# Patient Record
Sex: MALE | Race: WHITE | NOT HISPANIC OR LATINO | Employment: OTHER | URBAN - METROPOLITAN AREA
[De-identification: names, ages, dates, MRNs, and addresses within clinical notes are randomized per-mention and may not be internally consistent; named-entity substitution may affect disease eponyms.]

---

## 2017-04-06 ENCOUNTER — COMMUNICATION - HEALTHEAST (OUTPATIENT)
Dept: INTERNAL MEDICINE | Facility: CLINIC | Age: 62
End: 2017-04-06

## 2017-10-06 ENCOUNTER — OFFICE VISIT - HEALTHEAST (OUTPATIENT)
Dept: INTERNAL MEDICINE | Facility: CLINIC | Age: 62
End: 2017-10-06

## 2017-10-06 DIAGNOSIS — Z00.00 HEALTHCARE MAINTENANCE: ICD-10-CM

## 2017-10-06 DIAGNOSIS — E78.00 PURE HYPERCHOLESTEROLEMIA: ICD-10-CM

## 2017-10-06 DIAGNOSIS — Z12.5 PROSTATE CANCER SCREENING: ICD-10-CM

## 2017-10-06 DIAGNOSIS — Z51.81 MEDICATION MONITORING ENCOUNTER: ICD-10-CM

## 2017-10-06 DIAGNOSIS — I10 ESSENTIAL HYPERTENSION: ICD-10-CM

## 2017-10-06 LAB
CHOLEST SERPL-MCNC: 169 MG/DL
FASTING STATUS PATIENT QL REPORTED: YES
HDLC SERPL-MCNC: 56 MG/DL
LDLC SERPL CALC-MCNC: 72 MG/DL
PSA SERPL-MCNC: 2 NG/ML (ref 0–4.5)
TRIGL SERPL-MCNC: 207 MG/DL

## 2017-10-06 ASSESSMENT — MIFFLIN-ST. JEOR: SCORE: 1623.61

## 2017-10-09 ENCOUNTER — COMMUNICATION - HEALTHEAST (OUTPATIENT)
Dept: INTERNAL MEDICINE | Facility: CLINIC | Age: 62
End: 2017-10-09

## 2017-11-03 ENCOUNTER — OFFICE VISIT - HEALTHEAST (OUTPATIENT)
Dept: INTERNAL MEDICINE | Facility: CLINIC | Age: 62
End: 2017-11-03

## 2017-11-03 DIAGNOSIS — I10 ESSENTIAL HYPERTENSION: ICD-10-CM

## 2017-11-03 ASSESSMENT — MIFFLIN-ST. JEOR: SCORE: 1637.22

## 2017-12-04 ENCOUNTER — COMMUNICATION - HEALTHEAST (OUTPATIENT)
Dept: INTERNAL MEDICINE | Facility: CLINIC | Age: 62
End: 2017-12-04

## 2017-12-04 DIAGNOSIS — E78.00 PURE HYPERCHOLESTEROLEMIA: ICD-10-CM

## 2018-01-14 ENCOUNTER — COMMUNICATION - HEALTHEAST (OUTPATIENT)
Dept: INTERNAL MEDICINE | Facility: CLINIC | Age: 63
End: 2018-01-14

## 2018-01-14 DIAGNOSIS — I10 HTN (HYPERTENSION): ICD-10-CM

## 2018-03-21 ENCOUNTER — RECORDS - HEALTHEAST (OUTPATIENT)
Dept: ADMINISTRATIVE | Facility: OTHER | Age: 63
End: 2018-03-21

## 2018-04-11 ENCOUNTER — RECORDS - HEALTHEAST (OUTPATIENT)
Dept: ADMINISTRATIVE | Facility: OTHER | Age: 63
End: 2018-04-11

## 2018-09-08 ENCOUNTER — COMMUNICATION - HEALTHEAST (OUTPATIENT)
Dept: INTERNAL MEDICINE | Facility: CLINIC | Age: 63
End: 2018-09-08

## 2018-09-08 DIAGNOSIS — E78.00 PURE HYPERCHOLESTEROLEMIA: ICD-10-CM

## 2018-10-29 ENCOUNTER — COMMUNICATION - HEALTHEAST (OUTPATIENT)
Dept: INTERNAL MEDICINE | Facility: CLINIC | Age: 63
End: 2018-10-29

## 2018-10-29 DIAGNOSIS — I10 HTN (HYPERTENSION): ICD-10-CM

## 2018-11-12 ENCOUNTER — OFFICE VISIT - HEALTHEAST (OUTPATIENT)
Dept: INTERNAL MEDICINE | Facility: CLINIC | Age: 63
End: 2018-11-12

## 2018-11-12 ENCOUNTER — COMMUNICATION - HEALTHEAST (OUTPATIENT)
Dept: INTERNAL MEDICINE | Facility: CLINIC | Age: 63
End: 2018-11-12

## 2018-11-12 DIAGNOSIS — E78.00 PURE HYPERCHOLESTEROLEMIA: ICD-10-CM

## 2018-11-12 DIAGNOSIS — I10 ESSENTIAL HYPERTENSION: ICD-10-CM

## 2018-11-12 DIAGNOSIS — Z00.00 HEALTHCARE MAINTENANCE: ICD-10-CM

## 2018-11-12 DIAGNOSIS — Z51.81 MEDICATION MONITORING ENCOUNTER: ICD-10-CM

## 2018-11-12 DIAGNOSIS — Z12.5 PROSTATE CANCER SCREENING: ICD-10-CM

## 2018-11-12 LAB
ALBUMIN SERPL-MCNC: 4 G/DL (ref 3.5–5)
ALP SERPL-CCNC: 87 U/L (ref 45–120)
ALT SERPL W P-5'-P-CCNC: 21 U/L (ref 0–45)
ANION GAP SERPL CALCULATED.3IONS-SCNC: 10 MMOL/L (ref 5–18)
AST SERPL W P-5'-P-CCNC: 26 U/L (ref 0–40)
BILIRUB SERPL-MCNC: 0.5 MG/DL (ref 0–1)
BUN SERPL-MCNC: 22 MG/DL (ref 8–22)
CALCIUM SERPL-MCNC: 10 MG/DL (ref 8.5–10.5)
CHLORIDE BLD-SCNC: 103 MMOL/L (ref 98–107)
CO2 SERPL-SCNC: 27 MMOL/L (ref 22–31)
CREAT SERPL-MCNC: 0.83 MG/DL (ref 0.7–1.3)
ERYTHROCYTE [DISTWIDTH] IN BLOOD BY AUTOMATED COUNT: 14.4 % (ref 11–14.5)
GFR SERPL CREATININE-BSD FRML MDRD: >60 ML/MIN/1.73M2
GLUCOSE BLD-MCNC: 88 MG/DL (ref 70–125)
HCT VFR BLD AUTO: 43.8 % (ref 40–54)
HGB BLD-MCNC: 14.9 G/DL (ref 14–18)
LDLC SERPL CALC-MCNC: 60 MG/DL
MCH RBC QN AUTO: 29.7 PG (ref 27–34)
MCHC RBC AUTO-ENTMCNC: 34 G/DL (ref 32–36)
MCV RBC AUTO: 87 FL (ref 80–100)
PLATELET # BLD AUTO: 194 THOU/UL (ref 140–440)
PMV BLD AUTO: 10.4 FL (ref 8.5–12.5)
POTASSIUM BLD-SCNC: 4.2 MMOL/L (ref 3.5–5)
PROT SERPL-MCNC: 7.1 G/DL (ref 6–8)
PSA SERPL-MCNC: 2.3 NG/ML (ref 0–4.5)
RBC # BLD AUTO: 5.02 MILL/UL (ref 4.4–6.2)
SODIUM SERPL-SCNC: 140 MMOL/L (ref 136–145)
WBC: 7.7 THOU/UL (ref 4–11)

## 2018-11-12 ASSESSMENT — MIFFLIN-ST. JEOR: SCORE: 1612.49

## 2018-12-19 ENCOUNTER — COMMUNICATION - HEALTHEAST (OUTPATIENT)
Dept: INTERNAL MEDICINE | Facility: CLINIC | Age: 63
End: 2018-12-19

## 2018-12-19 DIAGNOSIS — E78.00 PURE HYPERCHOLESTEROLEMIA: ICD-10-CM

## 2019-04-29 ENCOUNTER — COMMUNICATION - HEALTHEAST (OUTPATIENT)
Dept: INTERNAL MEDICINE | Facility: CLINIC | Age: 64
End: 2019-04-29

## 2019-04-29 DIAGNOSIS — I10 HTN (HYPERTENSION): ICD-10-CM

## 2019-07-22 ENCOUNTER — OFFICE VISIT - HEALTHEAST (OUTPATIENT)
Dept: INTERNAL MEDICINE | Facility: CLINIC | Age: 64
End: 2019-07-22

## 2019-07-22 DIAGNOSIS — E78.00 PURE HYPERCHOLESTEROLEMIA: ICD-10-CM

## 2019-07-22 DIAGNOSIS — I10 ESSENTIAL HYPERTENSION: ICD-10-CM

## 2019-07-22 DIAGNOSIS — M25.512 ACUTE PAIN OF LEFT SHOULDER: ICD-10-CM

## 2019-07-22 DIAGNOSIS — J30.9 ALLERGIC RHINITIS, UNSPECIFIED SEASONALITY, UNSPECIFIED TRIGGER: ICD-10-CM

## 2019-08-01 ENCOUNTER — COMMUNICATION - HEALTHEAST (OUTPATIENT)
Dept: INTERNAL MEDICINE | Facility: CLINIC | Age: 64
End: 2019-08-01

## 2019-08-01 DIAGNOSIS — I10 HTN (HYPERTENSION): ICD-10-CM

## 2019-08-20 ENCOUNTER — RECORDS - HEALTHEAST (OUTPATIENT)
Dept: ADMINISTRATIVE | Facility: OTHER | Age: 64
End: 2019-08-20

## 2019-09-02 ENCOUNTER — COMMUNICATION - HEALTHEAST (OUTPATIENT)
Dept: INTERNAL MEDICINE | Facility: CLINIC | Age: 64
End: 2019-09-02

## 2019-09-02 DIAGNOSIS — J32.9 CHRONIC SINUSITIS, UNSPECIFIED LOCATION: ICD-10-CM

## 2019-09-12 ENCOUNTER — RECORDS - HEALTHEAST (OUTPATIENT)
Dept: ADMINISTRATIVE | Facility: OTHER | Age: 64
End: 2019-09-12

## 2019-10-27 ENCOUNTER — COMMUNICATION - HEALTHEAST (OUTPATIENT)
Dept: INTERNAL MEDICINE | Facility: CLINIC | Age: 64
End: 2019-10-27

## 2019-10-27 DIAGNOSIS — I10 HTN (HYPERTENSION): ICD-10-CM

## 2019-12-12 ENCOUNTER — RECORDS - HEALTHEAST (OUTPATIENT)
Dept: ADMINISTRATIVE | Facility: OTHER | Age: 64
End: 2019-12-12

## 2019-12-15 ENCOUNTER — COMMUNICATION - HEALTHEAST (OUTPATIENT)
Dept: INTERNAL MEDICINE | Facility: CLINIC | Age: 64
End: 2019-12-15

## 2019-12-15 DIAGNOSIS — E78.00 PURE HYPERCHOLESTEROLEMIA: ICD-10-CM

## 2020-01-02 ENCOUNTER — OFFICE VISIT - HEALTHEAST (OUTPATIENT)
Dept: INTERNAL MEDICINE | Facility: CLINIC | Age: 65
End: 2020-01-02

## 2020-01-02 DIAGNOSIS — E78.00 PURE HYPERCHOLESTEROLEMIA: ICD-10-CM

## 2020-01-02 DIAGNOSIS — Z00.00 HEALTHCARE MAINTENANCE: ICD-10-CM

## 2020-01-02 DIAGNOSIS — Z51.81 MEDICATION MONITORING ENCOUNTER: ICD-10-CM

## 2020-01-02 DIAGNOSIS — I10 ESSENTIAL HYPERTENSION: ICD-10-CM

## 2020-01-02 DIAGNOSIS — Z12.5 PROSTATE CANCER SCREENING: ICD-10-CM

## 2020-01-02 LAB
ALBUMIN SERPL-MCNC: 4.3 G/DL (ref 3.5–5)
ALP SERPL-CCNC: 84 U/L (ref 45–120)
ALT SERPL W P-5'-P-CCNC: 22 U/L (ref 0–45)
ANION GAP SERPL CALCULATED.3IONS-SCNC: 12 MMOL/L (ref 5–18)
AST SERPL W P-5'-P-CCNC: 30 U/L (ref 0–40)
BILIRUB SERPL-MCNC: 0.7 MG/DL (ref 0–1)
BUN SERPL-MCNC: 16 MG/DL (ref 8–22)
CALCIUM SERPL-MCNC: 10 MG/DL (ref 8.5–10.5)
CHLORIDE BLD-SCNC: 104 MMOL/L (ref 98–107)
CHOLEST SERPL-MCNC: 154 MG/DL
CO2 SERPL-SCNC: 23 MMOL/L (ref 22–31)
CREAT SERPL-MCNC: 0.91 MG/DL (ref 0.7–1.3)
ERYTHROCYTE [DISTWIDTH] IN BLOOD BY AUTOMATED COUNT: 14.2 % (ref 11–14.5)
FASTING STATUS PATIENT QL REPORTED: YES
GFR SERPL CREATININE-BSD FRML MDRD: >60 ML/MIN/1.73M2
GLUCOSE BLD-MCNC: 85 MG/DL (ref 70–125)
HCT VFR BLD AUTO: 40.8 % (ref 40–54)
HDLC SERPL-MCNC: 53 MG/DL
HGB BLD-MCNC: 13 G/DL (ref 14–18)
LDLC SERPL CALC-MCNC: 67 MG/DL
MCH RBC QN AUTO: 26.6 PG (ref 27–34)
MCHC RBC AUTO-ENTMCNC: 31.9 G/DL (ref 32–36)
MCV RBC AUTO: 84 FL (ref 80–100)
PLATELET # BLD AUTO: 261 THOU/UL (ref 140–440)
PMV BLD AUTO: 7 FL (ref 7–10)
POTASSIUM BLD-SCNC: 4 MMOL/L (ref 3.5–5)
PROT SERPL-MCNC: 7.1 G/DL (ref 6–8)
PSA SERPL-MCNC: 2.5 NG/ML (ref 0–4.5)
RBC # BLD AUTO: 4.88 MILL/UL (ref 4.4–6.2)
SODIUM SERPL-SCNC: 139 MMOL/L (ref 136–145)
TRIGL SERPL-MCNC: 168 MG/DL
WBC: 7.9 THOU/UL (ref 4–11)

## 2020-01-02 RX ORDER — ASCORBIC ACID, THIAMINE, RIBOFLAVIN, NIACINAMIDE, PYRIDOXINE, FOLIC ACID, COBALAMIN, BIOTIN, PANTOTHENIC ACID, ZINC 100; 1.5; 1.7; 20; 10; 1; 6; 300; 10; 5 MG/1; MG/1; MG/1; MG/1; MG/1; MG/1; UG/1; UG/1; MG/1; MG/1
1 TABLET, COATED ORAL EVERY OTHER DAY
Status: SHIPPED | COMMUNITY
Start: 2020-01-02

## 2020-01-02 RX ORDER — OMEPRAZOLE 40 MG/1
CAPSULE, DELAYED RELEASE ORAL
Refills: 2 | Status: SHIPPED | COMMUNITY
Start: 2019-12-13 | End: 2021-08-04

## 2020-01-02 ASSESSMENT — MIFFLIN-ST. JEOR: SCORE: 1606.6

## 2020-01-03 ENCOUNTER — COMMUNICATION - HEALTHEAST (OUTPATIENT)
Dept: INTERNAL MEDICINE | Facility: CLINIC | Age: 65
End: 2020-01-03

## 2020-01-03 DIAGNOSIS — D64.9 ANEMIA, UNSPECIFIED TYPE: ICD-10-CM

## 2020-01-04 ENCOUNTER — COMMUNICATION - HEALTHEAST (OUTPATIENT)
Dept: INTERNAL MEDICINE | Facility: CLINIC | Age: 65
End: 2020-01-04

## 2020-01-07 ENCOUNTER — AMBULATORY - HEALTHEAST (OUTPATIENT)
Dept: LAB | Facility: CLINIC | Age: 65
End: 2020-01-07

## 2020-01-07 ENCOUNTER — COMMUNICATION - HEALTHEAST (OUTPATIENT)
Dept: INTERNAL MEDICINE | Facility: CLINIC | Age: 65
End: 2020-01-07

## 2020-01-07 DIAGNOSIS — D64.9 ANEMIA, UNSPECIFIED TYPE: ICD-10-CM

## 2020-01-07 DIAGNOSIS — D50.9 IRON DEFICIENCY ANEMIA, UNSPECIFIED IRON DEFICIENCY ANEMIA TYPE: ICD-10-CM

## 2020-01-07 LAB
BASOPHILS # BLD AUTO: 0 THOU/UL (ref 0–0.2)
BASOPHILS NFR BLD AUTO: 1 % (ref 0–2)
EOSINOPHIL # BLD AUTO: 0.3 THOU/UL (ref 0–0.4)
EOSINOPHIL NFR BLD AUTO: 5 % (ref 0–6)
ERYTHROCYTE [DISTWIDTH] IN BLOOD BY AUTOMATED COUNT: 14.7 % (ref 11–14.5)
FERRITIN SERPL-MCNC: 23 NG/ML (ref 27–300)
HCT VFR BLD AUTO: 40.5 % (ref 40–54)
HGB BLD-MCNC: 13.1 G/DL (ref 14–18)
IRON SATN MFR SERPL: 8 % (ref 20–50)
IRON SERPL-MCNC: 32 UG/DL (ref 42–175)
LYMPHOCYTES # BLD AUTO: 1 THOU/UL (ref 0.8–4.4)
LYMPHOCYTES NFR BLD AUTO: 16 % (ref 20–40)
MCH RBC QN AUTO: 26.9 PG (ref 27–34)
MCHC RBC AUTO-ENTMCNC: 32.4 G/DL (ref 32–36)
MCV RBC AUTO: 83 FL (ref 80–100)
MONOCYTES # BLD AUTO: 0.7 THOU/UL (ref 0–0.9)
MONOCYTES NFR BLD AUTO: 11 % (ref 2–10)
NEUTROPHILS # BLD AUTO: 4.2 THOU/UL (ref 2–7.7)
NEUTROPHILS NFR BLD AUTO: 67 % (ref 50–70)
PLATELET # BLD AUTO: 231 THOU/UL (ref 140–440)
PMV BLD AUTO: 7.7 FL (ref 7–10)
RBC # BLD AUTO: 4.88 MILL/UL (ref 4.4–6.2)
TIBC SERPL-MCNC: 413 UG/DL (ref 313–563)
TRANSFERRIN SERPL-MCNC: 331 MG/DL (ref 212–360)
VIT B12 SERPL-MCNC: 613 PG/ML (ref 213–816)
WBC: 6.3 THOU/UL (ref 4–11)

## 2020-01-21 ENCOUNTER — RECORDS - HEALTHEAST (OUTPATIENT)
Dept: ADMINISTRATIVE | Facility: OTHER | Age: 65
End: 2020-01-21

## 2020-01-22 ENCOUNTER — RECORDS - HEALTHEAST (OUTPATIENT)
Dept: ADMINISTRATIVE | Facility: OTHER | Age: 65
End: 2020-01-22

## 2020-01-22 ENCOUNTER — COMMUNICATION - HEALTHEAST (OUTPATIENT)
Dept: INTERNAL MEDICINE | Facility: CLINIC | Age: 65
End: 2020-01-22

## 2020-01-22 DIAGNOSIS — D50.9 IRON DEFICIENCY ANEMIA, UNSPECIFIED IRON DEFICIENCY ANEMIA TYPE: ICD-10-CM

## 2020-02-06 ENCOUNTER — RECORDS - HEALTHEAST (OUTPATIENT)
Dept: ADMINISTRATIVE | Facility: OTHER | Age: 65
End: 2020-02-06

## 2020-02-11 ENCOUNTER — OFFICE VISIT - HEALTHEAST (OUTPATIENT)
Dept: INTERNAL MEDICINE | Facility: CLINIC | Age: 65
End: 2020-02-11

## 2020-02-11 DIAGNOSIS — D50.9 IRON DEFICIENCY ANEMIA, UNSPECIFIED IRON DEFICIENCY ANEMIA TYPE: ICD-10-CM

## 2020-02-11 DIAGNOSIS — I10 ESSENTIAL HYPERTENSION: ICD-10-CM

## 2020-02-11 DIAGNOSIS — K21.9 REFLUX LARYNGITIS: ICD-10-CM

## 2020-02-11 DIAGNOSIS — J04.0 REFLUX LARYNGITIS: ICD-10-CM

## 2020-02-11 RX ORDER — FERROUS SULFATE 325(65) MG
1 TABLET ORAL
Qty: 60 TABLET | Refills: 0 | Status: SHIPPED | OUTPATIENT
Start: 2020-02-11 | End: 2021-08-04

## 2020-03-12 ENCOUNTER — RECORDS - HEALTHEAST (OUTPATIENT)
Dept: ADMINISTRATIVE | Facility: OTHER | Age: 65
End: 2020-03-12

## 2020-05-31 ENCOUNTER — COMMUNICATION - HEALTHEAST (OUTPATIENT)
Dept: INTERNAL MEDICINE | Facility: CLINIC | Age: 65
End: 2020-05-31

## 2020-09-20 ENCOUNTER — COMMUNICATION - HEALTHEAST (OUTPATIENT)
Dept: INTERNAL MEDICINE | Facility: CLINIC | Age: 65
End: 2020-09-20

## 2020-09-20 DIAGNOSIS — E78.00 PURE HYPERCHOLESTEROLEMIA: ICD-10-CM

## 2020-09-23 ENCOUNTER — RECORDS - HEALTHEAST (OUTPATIENT)
Dept: ADMINISTRATIVE | Facility: OTHER | Age: 65
End: 2020-09-23

## 2020-10-01 ENCOUNTER — AMBULATORY - HEALTHEAST (OUTPATIENT)
Dept: LAB | Facility: CLINIC | Age: 65
End: 2020-10-01

## 2020-10-01 DIAGNOSIS — D50.9 IRON DEFICIENCY ANEMIA, UNSPECIFIED IRON DEFICIENCY ANEMIA TYPE: ICD-10-CM

## 2020-10-01 LAB
BASOPHILS # BLD AUTO: 0 THOU/UL (ref 0–0.2)
BASOPHILS NFR BLD AUTO: 0 % (ref 0–2)
EOSINOPHIL # BLD AUTO: 0.2 THOU/UL (ref 0–0.4)
EOSINOPHIL NFR BLD AUTO: 2 % (ref 0–6)
ERYTHROCYTE [DISTWIDTH] IN BLOOD BY AUTOMATED COUNT: 11.7 % (ref 11–14.5)
FERRITIN SERPL-MCNC: 38 NG/ML (ref 27–300)
HCT VFR BLD AUTO: 47.3 % (ref 40–54)
HGB BLD-MCNC: 15.5 G/DL (ref 14–18)
IRON SATN MFR SERPL: 28 % (ref 20–50)
IRON SERPL-MCNC: 100 UG/DL (ref 42–175)
LYMPHOCYTES # BLD AUTO: 1.4 THOU/UL (ref 0.8–4.4)
LYMPHOCYTES NFR BLD AUTO: 19 % (ref 20–40)
MCH RBC QN AUTO: 30.6 PG (ref 27–34)
MCHC RBC AUTO-ENTMCNC: 32.8 G/DL (ref 32–36)
MCV RBC AUTO: 93 FL (ref 80–100)
MONOCYTES # BLD AUTO: 0.4 THOU/UL (ref 0–0.9)
MONOCYTES NFR BLD AUTO: 6 % (ref 2–10)
NEUTROPHILS # BLD AUTO: 5.3 THOU/UL (ref 2–7.7)
NEUTROPHILS NFR BLD AUTO: 72 % (ref 50–70)
PLATELET # BLD AUTO: 186 THOU/UL (ref 140–440)
PMV BLD AUTO: 7.6 FL (ref 7–10)
RBC # BLD AUTO: 5.08 MILL/UL (ref 4.4–6.2)
TIBC SERPL-MCNC: 358 UG/DL (ref 313–563)
TRANSFERRIN SERPL-MCNC: 287 MG/DL (ref 212–360)
WBC: 7.3 THOU/UL (ref 4–11)

## 2020-10-02 ENCOUNTER — COMMUNICATION - HEALTHEAST (OUTPATIENT)
Dept: INTERNAL MEDICINE | Facility: CLINIC | Age: 65
End: 2020-10-02

## 2021-01-17 ENCOUNTER — COMMUNICATION - HEALTHEAST (OUTPATIENT)
Dept: INTERNAL MEDICINE | Facility: CLINIC | Age: 66
End: 2021-01-17

## 2021-01-17 DIAGNOSIS — I10 ESSENTIAL HYPERTENSION: ICD-10-CM

## 2021-01-21 ENCOUNTER — OFFICE VISIT - HEALTHEAST (OUTPATIENT)
Dept: INTERNAL MEDICINE | Facility: CLINIC | Age: 66
End: 2021-01-21

## 2021-01-21 DIAGNOSIS — E78.00 HYPERCHOLESTEROLEMIA: ICD-10-CM

## 2021-01-21 DIAGNOSIS — Z12.5 SCREENING FOR PROSTATE CANCER: ICD-10-CM

## 2021-01-21 DIAGNOSIS — Z23 NEED FOR VACCINATION WITH 13-POLYVALENT PNEUMOCOCCAL CONJUGATE VACCINE: ICD-10-CM

## 2021-01-21 DIAGNOSIS — J04.0 REFLUX LARYNGITIS: ICD-10-CM

## 2021-01-21 DIAGNOSIS — Z86.0100 HISTORY OF COLONIC POLYPS: ICD-10-CM

## 2021-01-21 DIAGNOSIS — Z51.81 ENCOUNTER FOR THERAPEUTIC DRUG MONITORING: ICD-10-CM

## 2021-01-21 DIAGNOSIS — I10 ESSENTIAL HYPERTENSION: ICD-10-CM

## 2021-01-21 DIAGNOSIS — Z00.00 ROUTINE GENERAL MEDICAL EXAMINATION AT A HEALTH CARE FACILITY: ICD-10-CM

## 2021-01-21 DIAGNOSIS — Z00.00 WELCOME TO MEDICARE PREVENTIVE VISIT: ICD-10-CM

## 2021-01-21 DIAGNOSIS — D50.9 IRON DEFICIENCY ANEMIA, UNSPECIFIED IRON DEFICIENCY ANEMIA TYPE: ICD-10-CM

## 2021-01-21 DIAGNOSIS — K21.9 REFLUX LARYNGITIS: ICD-10-CM

## 2021-01-21 LAB
ALBUMIN SERPL-MCNC: 4.6 G/DL (ref 3.5–5)
ALP SERPL-CCNC: 84 U/L (ref 45–120)
ALT SERPL W P-5'-P-CCNC: 19 U/L (ref 0–45)
ANION GAP SERPL CALCULATED.3IONS-SCNC: 10 MMOL/L (ref 5–18)
AST SERPL W P-5'-P-CCNC: 25 U/L (ref 0–40)
ATRIAL RATE - MUSE: 61 BPM
BILIRUB SERPL-MCNC: 0.8 MG/DL (ref 0–1)
BUN SERPL-MCNC: 16 MG/DL (ref 8–22)
CALCIUM SERPL-MCNC: 10.1 MG/DL (ref 8.5–10.5)
CHLORIDE BLD-SCNC: 104 MMOL/L (ref 98–107)
CO2 SERPL-SCNC: 27 MMOL/L (ref 22–31)
CREAT SERPL-MCNC: 0.84 MG/DL (ref 0.7–1.3)
DIASTOLIC BLOOD PRESSURE - MUSE: NORMAL
ERYTHROCYTE [DISTWIDTH] IN BLOOD BY AUTOMATED COUNT: 12.8 % (ref 11–14.5)
FERRITIN SERPL-MCNC: 51 NG/ML (ref 27–300)
GFR SERPL CREATININE-BSD FRML MDRD: >60 ML/MIN/1.73M2
GLUCOSE BLD-MCNC: 87 MG/DL (ref 70–125)
HCT VFR BLD AUTO: 49.8 % (ref 40–54)
HGB BLD-MCNC: 16.6 G/DL (ref 14–18)
INTERPRETATION ECG - MUSE: NORMAL
IRON SATN MFR SERPL: 30 % (ref 20–50)
IRON SERPL-MCNC: 113 UG/DL (ref 42–175)
LDLC SERPL CALC-MCNC: 40 MG/DL
MCH RBC QN AUTO: 31 PG (ref 27–34)
MCHC RBC AUTO-ENTMCNC: 33.4 G/DL (ref 32–36)
MCV RBC AUTO: 93 FL (ref 80–100)
P AXIS - MUSE: 24 DEGREES
PLATELET # BLD AUTO: 186 THOU/UL (ref 140–440)
PMV BLD AUTO: 7.4 FL (ref 7–10)
POTASSIUM BLD-SCNC: 4.2 MMOL/L (ref 3.5–5)
PR INTERVAL - MUSE: 186 MS
PROT SERPL-MCNC: 7.3 G/DL (ref 6–8)
PSA SERPL-MCNC: 3.3 NG/ML (ref 0–4.5)
QRS DURATION - MUSE: 112 MS
QT - MUSE: 418 MS
QTC - MUSE: 420 MS
R AXIS - MUSE: 36 DEGREES
RBC # BLD AUTO: 5.37 MILL/UL (ref 4.4–6.2)
SODIUM SERPL-SCNC: 141 MMOL/L (ref 136–145)
SYSTOLIC BLOOD PRESSURE - MUSE: NORMAL
T AXIS - MUSE: 6 DEGREES
TIBC SERPL-MCNC: 371 UG/DL (ref 313–563)
TRANSFERRIN SERPL-MCNC: 297 MG/DL (ref 212–360)
VENTRICULAR RATE- MUSE: 61 BPM
WBC: 7 THOU/UL (ref 4–11)

## 2021-01-21 ASSESSMENT — MIFFLIN-ST. JEOR: SCORE: 1526.2

## 2021-01-22 ENCOUNTER — COMMUNICATION - HEALTHEAST (OUTPATIENT)
Dept: INTERNAL MEDICINE | Facility: CLINIC | Age: 66
End: 2021-01-22

## 2021-01-26 ENCOUNTER — COMMUNICATION - HEALTHEAST (OUTPATIENT)
Dept: INTERNAL MEDICINE | Facility: CLINIC | Age: 66
End: 2021-01-26

## 2021-01-26 DIAGNOSIS — E78.00 HYPERCHOLESTEROLEMIA: ICD-10-CM

## 2021-03-03 ENCOUNTER — HOSPITAL ENCOUNTER (OUTPATIENT)
Dept: CT IMAGING | Facility: CLINIC | Age: 66
Discharge: HOME | End: 2021-03-03
Attending: INTERNAL MEDICINE

## 2021-03-03 ENCOUNTER — COMMUNICATION - HEALTHEAST (OUTPATIENT)
Dept: INTERNAL MEDICINE | Facility: CLINIC | Age: 66
End: 2021-03-03

## 2021-03-03 DIAGNOSIS — E78.00 HYPERCHOLESTEROLEMIA: ICD-10-CM

## 2021-03-03 LAB
CV CALCIUM SCORE AGATSTON LM: 0
CV CALCIUM SCORING AGATSON LAD: 52
CV CALCIUM SCORING AGATSTON CX: 0
CV CALCIUM SCORING AGATSTON RCA: 0
CV CALCIUM SCORING AGATSTON TOTAL: 52

## 2021-04-14 ENCOUNTER — COMMUNICATION - HEALTHEAST (OUTPATIENT)
Dept: INTERNAL MEDICINE | Facility: CLINIC | Age: 66
End: 2021-04-14

## 2021-04-14 DIAGNOSIS — E78.00 PURE HYPERCHOLESTEROLEMIA: ICD-10-CM

## 2021-04-14 RX ORDER — SIMVASTATIN 20 MG
20 TABLET ORAL AT BEDTIME
Qty: 90 TABLET | Refills: 3 | Status: SHIPPED | OUTPATIENT
Start: 2021-04-14 | End: 2022-02-03

## 2021-04-20 ENCOUNTER — COMMUNICATION - HEALTHEAST (OUTPATIENT)
Dept: INTERNAL MEDICINE | Facility: CLINIC | Age: 66
End: 2021-04-20

## 2021-04-20 DIAGNOSIS — I10 ESSENTIAL HYPERTENSION: ICD-10-CM

## 2021-04-21 RX ORDER — LISINOPRIL 2.5 MG/1
2.5 TABLET ORAL DAILY
Qty: 90 TABLET | Refills: 2 | Status: SHIPPED | OUTPATIENT
Start: 2021-04-21 | End: 2022-01-11

## 2021-04-23 ENCOUNTER — COMMUNICATION - HEALTHEAST (OUTPATIENT)
Dept: ADMINISTRATIVE | Facility: CLINIC | Age: 66
End: 2021-04-23

## 2021-05-27 ENCOUNTER — RECORDS - HEALTHEAST (OUTPATIENT)
Dept: ADMINISTRATIVE | Facility: OTHER | Age: 66
End: 2021-05-27

## 2021-05-28 NOTE — TELEPHONE ENCOUNTER
Refill Approved    Rx renewed per Medication Renewal Policy. Medication was last renewed on 10/30/18.    Elena Leigh, [facility] Triage/Med Refill 4/29/2019     Requested Prescriptions   Pending Prescriptions Disp Refills     lisinopril (PRINIVIL,ZESTRIL) 2.5 MG tablet [Pharmacy Med Name: Lisinopril Oral Tablet 2.5 MG] 90 tablet 0     Sig: TAKE ONE TABLET BY MOUTH ONE TIME DAILY       Ace Inhibitors Refill Protocol Passed - 4/29/2019  7:00 AM        Passed - PCP or prescribing provider visit in past 12 months       Last office visit with prescriber/PCP: 11/3/2017 Sheldon Harvey MD OR same dept: Visit date not found OR same specialty: 11/3/2017 Sheldon Harvey MD  Last physical: 11/12/2018 Last MTM visit: Visit date not found   Next visit within 3 mo: Visit date not found  Next physical within 3 mo: Visit date not found  Prescriber OR PCP: Sheldon Harvey MD  Last diagnosis associated with med order: 1. HTN (hypertension)  - lisinopril (PRINIVIL,ZESTRIL) 2.5 MG tablet [Pharmacy Med Name: Lisinopril Oral Tablet 2.5 MG]; TAKE ONE TABLET BY MOUTH ONE TIME DAILY   Dispense: 90 tablet; Refill: 0    If protocol passes may refill for 12 months if within 3 months of last provider visit (or a total of 15 months).             Passed - Serum Potassium in past 12 months     Lab Results   Component Value Date    Potassium 4.2 11/12/2018             Passed - Blood pressure filed in past 12 months     BP Readings from Last 1 Encounters:   11/12/18 120/84             Passed - Serum Creatinine in past 12 months     Creatinine   Date Value Ref Range Status   11/12/2018 0.83 0.70 - 1.30 mg/dL Final

## 2021-05-29 ENCOUNTER — HEALTH MAINTENANCE LETTER (OUTPATIENT)
Age: 66
End: 2021-05-29

## 2021-05-30 NOTE — PROGRESS NOTES
"[facility] Follow Up Note    Roberto Belle   63 y.o. male    Date of Visit: 7/22/2019    Chief Complaint   Patient presents with     Postnasal drainage     Complaints of postnasal drainage getting worse in the last few weeks with constantly having to clear throat. Sneezed yesterday producing a small \"red bb gun sized ball\"     Subjective  For 6 weeks of increasing nasal congestion and postnasal drip, started on Allegra.  Proximal he 2 weeks ago he had a bonfire.  After that he had increasing nasal congestion and postnasal drip and started Nasacort which has started to help some.    He sneezed today and had a small nasal norris come out, made of mucus.  He did bring in that in for me to examine.    No epistaxis.  No fever or ear pain or sinus pain.  No shortness of breath or wheezing.    Hypertension controlled on lisinopril.    No significant cough.    He is also having some left shoulder tendinitis pain but not severe, that over the past couple of weeks.    He is on simvastatin 20 mg.  Family history of coronary disease with father.    He is on a low-dose aspirin.    Hypertension controlled with lisinopril 2.5 mg a day.    PMHx:    Past Medical History:   Diagnosis Date     Hypertension      PSHx:    Past Surgical History:   Procedure Laterality Date     UNDESCENDED TESTICLE EXPLORATION Left 1968     Immunizations:   Immunization History   Administered Date(s) Administered     Influenza, inj, historic,unspecified 10/19/2015, 09/20/2016, 09/18/2017     Influenza, seasonal,quad inj 6-35 mos 10/23/2014     Td,adult,historic,unspecified 12/15/2000     Tdap 11/18/2011       ROS A comprehensive review of systems was performed and was otherwise negative    Medications, allergies, and problem list were reviewed and updated    Exam  /82 (Patient Site: Right Arm, Patient Position: Sitting, Cuff Size: Adult Large)   Pulse 68   Wt 198 lb (89.8 kg)   BMI 31.48 kg/m    Nasal mucosa appears just mildly " edematous but clear discharge.  No purulent discharge or mass.  He does have moderate postnasal drip pharyngitis bilaterally.  No exudate.  No cervical or supraclavicular adenopathy or neck mass.  Lungs clear to auscultation.  Heart is regular without murmur.  Still good range of motion on the shoulder.    Assessment/Plan  1. Allergic rhinitis, unspecified seasonality, unspecified trigger  Increased symptoms with postnasal drip.  Mucus Nati expressed.  Symptoms better on Allegra and Nasacort.  I discussed saline gargling and saline irrigation with Loretta pot.    If symptoms do not improve over the next couple of weeks, or if they worsen, he can request an ENT referral.    No fever or acute sinusitis symptoms to warrant antibiotic at this time.    2. Acute pain of left shoulder  Likely tendinitis.  Patient was told he could request a referral to the orthopedic clinic if that worsens.    Avoiding NSAIDs with his hypertension on low-dose lisinopril.    3. Essential hypertension  Controlled on lisinopril.    Family history of coronary disease, and hypercholesterolemia on simvastatin.  I did discuss option for cardiac CT scan which he had asked about.  Cholesterol is well controlled on current simvastatin.  If he did want to consider going off simvastatin, he could consider a cardiac CT scan for risk stratification.  Otherwise with his hypertension and moderate cardiovascular risk, I would recommend he continue on a statin.    Follow this fall for a physical exam.    Return in about 4 months (around 11/22/2019) for Annual physical.   Patient Instructions   Continue current Allegra and Nasacort.  Gargle with salt water and you can use saline nasal irrigation such as Loretta pot.    Postnasal drip irritation should improve over the next few weeks.  If it continues to worsen, or not improve, you can request a referral to ENT for consult.    Follow-up in the fall for your physical exam.    If your shoulder pain worsens, you  can request a referral to the orthopedic clinic for further evaluation.  You could consider physical therapy for the shoulder.    Sheldon Harvey MD        Current Outpatient Medications   Medication Sig Dispense Refill     aspirin 81 mg chewable tablet Chew 81 mg daily.       co-enzyme Q-10 30 mg capsule Take 30 mg by mouth 2 (two) times a day.        lisinopril (PRINIVIL,ZESTRIL) 2.5 MG tablet Take 1 tablet (2.5 mg total) by mouth daily. 90 tablet 0     MULTIVITAMIN ORAL Take 1 tablet by mouth daily.              OMEGA-3/DHA/EPA/FISH OIL (FISH OIL-OMEGA-3 FATTY ACIDS) 300-1,000 mg capsule Take 2 g by mouth daily.       phytosterol combination no.1 500 mg cap Take by mouth. Take 2 tablets by mouth       simvastatin (ZOCOR) 20 MG tablet Take 1 tablet (20 mg total) by mouth at bedtime. 90 tablet 3     thiamine (VITAMIN B-1) 50 MG tablet Take 50 mg by mouth every other day.              triamcinolone (NASACORT) 55 mcg nasal inhaler 2 sprays into each nostril daily as needed.        No current facility-administered medications for this visit.      No Known Allergies  Social History     Tobacco Use     Smoking status: Never Smoker     Smokeless tobacco: Never Used   Substance Use Topics     Alcohol use: Yes     Drug use: Not on file

## 2021-05-30 NOTE — PATIENT INSTRUCTIONS - HE
Continue current Allegra and Nasacort.  Gargle with salt water and you can use saline nasal irrigation such as Loretta pot.    Postnasal drip irritation should improve over the next few weeks.  If it continues to worsen, or not improve, you can request a referral to ENT for consult.    Follow-up in the fall for your physical exam.    If your shoulder pain worsens, you can request a referral to the orthopedic clinic for further evaluation.  You could consider physical therapy for the shoulder.

## 2021-05-31 VITALS — BODY MASS INDEX: 30.45 KG/M2 | WEIGHT: 194 LBS | HEIGHT: 67 IN

## 2021-05-31 VITALS — BODY MASS INDEX: 30.92 KG/M2 | WEIGHT: 197 LBS | HEIGHT: 67 IN

## 2021-05-31 NOTE — TELEPHONE ENCOUNTER
Refill Approved    Rx renewed per Medication Renewal Policy. Medication was last renewed on 4/29/19.    Julia Phoenix, [facility] Triage/Med Refill 8/1/2019     Requested Prescriptions   Pending Prescriptions Disp Refills     lisinopril (PRINIVIL,ZESTRIL) 2.5 MG tablet [Pharmacy Med Name: Lisinopril Oral Tablet 2.5 MG] 90 tablet 0     Sig: Take 1 tablet (2.5 mg total) by mouth daily.       Ace Inhibitors Refill Protocol Passed - 8/1/2019 10:33 AM        Passed - PCP or prescribing provider visit in past 12 months       Last office visit with prescriber/PCP: 7/22/2019 Sheldon Harvey MD OR same dept: Visit date not found OR same specialty: 7/22/2019 Sheldon Harvey MD  Last physical: 11/12/2018 Last MTM visit: Visit date not found   Next visit within 3 mo: Visit date not found  Next physical within 3 mo: Visit date not found  Prescriber OR PCP: Sheldon Harvey MD  Last diagnosis associated with med order: 1. HTN (hypertension)  - lisinopril (PRINIVIL,ZESTRIL) 2.5 MG tablet [Pharmacy Med Name: Lisinopril Oral Tablet 2.5 MG]; Take 1 tablet (2.5 mg total) by mouth daily.  Dispense: 90 tablet; Refill: 0    If protocol passes may refill for 12 months if within 3 months of last provider visit (or a total of 15 months).             Passed - Serum Potassium in past 12 months     Lab Results   Component Value Date    Potassium 4.2 11/12/2018             Passed - Blood pressure filed in past 12 months     BP Readings from Last 1 Encounters:   07/22/19 124/82             Passed - Serum Creatinine in past 12 months     Creatinine   Date Value Ref Range Status   11/12/2018 0.83 0.70 - 1.30 mg/dL Final

## 2021-06-02 VITALS — BODY MASS INDEX: 30.34 KG/M2 | WEIGHT: 193.3 LBS | HEIGHT: 67 IN

## 2021-06-02 NOTE — TELEPHONE ENCOUNTER
Refill Approved    Rx renewed per Medication Renewal Policy. Medication was last renewed on 8/1/2019         Marvin Sosa, [facility] Triage/Med Refill 10/27/2019     Requested Prescriptions   Pending Prescriptions Disp Refills     lisinopril (PRINIVIL,ZESTRIL) 2.5 MG tablet [Pharmacy Med Name: Lisinopril Oral Tablet 2.5 MG] 90 tablet 0     Sig: Take 1 tablet (2.5 mg total) by mouth daily.       Ace Inhibitors Refill Protocol Passed - 10/27/2019  7:00 AM        Passed - PCP or prescribing provider visit in past 12 months       Last office visit with prescriber/PCP: 7/22/2019 Sheldon Harvey MD OR same dept: Visit date not found OR same specialty: 7/22/2019 Sheldon Harvey MD  Last physical: 11/12/2018 Last MTM visit: Visit date not found   Next visit within 3 mo: Visit date not found  Next physical within 3 mo: Visit date not found  Prescriber OR PCP: Sheldon Harvey MD  Last diagnosis associated with med order: 1. HTN (hypertension)  - lisinopril (PRINIVIL,ZESTRIL) 2.5 MG tablet [Pharmacy Med Name: Lisinopril Oral Tablet 2.5 MG]; Take 1 tablet (2.5 mg total) by mouth daily.  Dispense: 90 tablet; Refill: 0    If protocol passes may refill for 12 months if within 3 months of last provider visit (or a total of 15 months).             Passed - Serum Potassium in past 12 months     Lab Results   Component Value Date    Potassium 4.2 11/12/2018             Passed - Blood pressure filed in past 12 months     BP Readings from Last 1 Encounters:   07/22/19 124/82             Passed - Serum Creatinine in past 12 months     Creatinine   Date Value Ref Range Status   11/12/2018 0.83 0.70 - 1.30 mg/dL Final

## 2021-06-03 VITALS — WEIGHT: 198 LBS | BODY MASS INDEX: 31.48 KG/M2

## 2021-06-04 VITALS
HEART RATE: 72 BPM | DIASTOLIC BLOOD PRESSURE: 90 MMHG | BODY MASS INDEX: 30.13 KG/M2 | HEIGHT: 67 IN | SYSTOLIC BLOOD PRESSURE: 150 MMHG | WEIGHT: 192 LBS

## 2021-06-04 VITALS
SYSTOLIC BLOOD PRESSURE: 110 MMHG | HEART RATE: 72 BPM | BODY MASS INDEX: 30.21 KG/M2 | DIASTOLIC BLOOD PRESSURE: 78 MMHG | WEIGHT: 190 LBS

## 2021-06-04 NOTE — TELEPHONE ENCOUNTER
Patient notified of clinician's message and verbalized understanding. No further questions at this time.   Lab appointment scheduled.  Michell Calhoun CMA ............... 3:46 PM, 01/03/20

## 2021-06-04 NOTE — PATIENT INSTRUCTIONS - HE
Check blood pressure on your own.  Blood pressure is running above 135/85, contact me in clinic and I would have you consider increasing your lisinopril to 5 mg a day.  You would need to follow-up in clinic in 2 to 3 weeks after the increase of lisinopril, however.    If your blood pressure remains well controlled, see me in 1 year for welcome to Medicare physical exam.    Make sure you are getting at least 20 minutes of aerobic exercise 3-4 times a week.  Avoid prolonged sitting.    Continue range of motion exercises for your shoulder on a daily basis.  Avoid ibuprofen and Aleve, as that can affect blood pressure.    Your colonoscopy will be due July 2021

## 2021-06-04 NOTE — TELEPHONE ENCOUNTER
Refill Approved    Rx renewed per Medication Renewal Policy. Medication was last renewed on 12/19/18.    Julia Phoenix, [facility] Triage/Med Refill 12/16/2019     Requested Prescriptions   Pending Prescriptions Disp Refills     simvastatin (ZOCOR) 20 MG tablet [Pharmacy Med Name: Simvastatin Oral Tablet 20 MG] 90 tablet 2     Sig: TAKE ONE TABLET BY MOUTH AT BEDTIME       Statins Refill Protocol (Hmg CoA Reductase Inhibitors) Passed - 12/15/2019  7:00 AM        Passed - PCP or prescribing provider visit in past 12 months      Last office visit with prescriber/PCP: 7/22/2019 Sheldon Harvey MD OR same dept: Visit date not found OR same specialty: 7/22/2019 Sheldon Harvey MD  Last physical: 11/12/2018 Last MTM visit: Visit date not found   Next visit within 3 mo: Visit date not found  Next physical within 3 mo: Visit date not found  Prescriber OR PCP: Sheldon Harvey MD  Last diagnosis associated with med order: 1. Hypercholesterolemia  - simvastatin (ZOCOR) 20 MG tablet [Pharmacy Med Name: Simvastatin Oral Tablet 20 MG]; TAKE ONE TABLET BY MOUTH AT BEDTIME   Dispense: 90 tablet; Refill: 2    If protocol passes may refill for 12 months if within 3 months of last provider visit (or a total of 15 months).

## 2021-06-04 NOTE — TELEPHONE ENCOUNTER
Patient informed. Actually already was informed by someone else an hour ago with a lab only appt schedule for 01/07.      Martha Villasenor, [facility]  3:28 PM  1/3/2020

## 2021-06-04 NOTE — TELEPHONE ENCOUNTER
Contact patient.  His labs were okay except for a mildly low hemoglobin.  Unclear reason for low hemoglobin.  May have been delusional from drinking a lot of water this day as he was fasting.  But, he needs further evaluation of his anemia and recheck of hemoglobin.    Have him make an appointment within the next month to reevaluate anemia.

## 2021-06-04 NOTE — TELEPHONE ENCOUNTER
Correction of typo below.  May have been dilutional with drinking water on the day of fasting.    Have patient repeat the hemogram next week, with additional blood work.  This is just a lab only appointment, nonfasting    If he continues to have anemia on recheck next week, I would have him seen in clinic for an appointment to see provider to discuss what may be causing anemia.

## 2021-06-04 NOTE — PROGRESS NOTES
[facility] clinic Follow Up Note    Roberto Belle   64 y.o. male    Date of Visit: 1/2/2020    Chief Complaint   Patient presents with     Annual Exam     Subjective  Roberto is a 64-year-old male here for a health maintenance physical exam.  He just retired this month.    He does go to the [facility] about 3 days a week.  Good exertional ability.    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    Past history of hypertension but has been well controlled around 120/80 on repeated checks when he donates platelets.  Denies lightheaded dizzy spells.  On lisinopril 2.5 mg a day that was started in 2017.    He has moderate obesity but does not have daytime sleepiness and no history of sleep apnea diagnosed.    His father had early coronary disease.  Patient has not had vascular work-up.  No chest pain or exertional symptoms.    Patient is on 20 mg of simvastatin and tolerates well without myalgias or right upper quadrant pain.  November 2018 LDL 60.    Patient stopped taking aspirin because he was worried about his donations of platelets and bleeding risk.  No history of ulcer or bleeding.    Chronic cough is significantly better since being on Prilosec daily.  He was diagnosed with reflux laryngitis.  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  He sees the ENT again in the spring.  He recently had a laryngoscopy.    He is never smoked.    No history of diabetes.  Diet is fairly good and stable.  He is actually 6 pounds lighter than last year.    He denies overeating over the holidays.    He is not taking any NSAIDs currently.    He had some left shoulder tendinitis, was seen by the orthopedic clinic and given some range of motion exercises which have helped.  He did not have any injections.  Pain is minimal now.  Pain is mainly from sleep position.    No abdominal pain and bowels are regular.  No blood in stool or melena.    July 2011 colonoscopy negative with  "no family history of colon cancer.  10-year follow-up.    He does not have significant nocturia or problems urinating.    No history of skin cancer.  History of AK's and he does see the dermatologist yearly.    He saw the ophthalmologist last year, no problems.    No headache complaints or sinusitis issues.    No falls.      PMHx:    Past Medical History:   Diagnosis Date     Hypertension      PSHx:    Past Surgical History:   Procedure Laterality Date     UNDESCENDED TESTICLE EXPLORATION Left 1968     Immunizations:   Immunization History   Administered Date(s) Administered     Influenza, inj, historic,unspecified 10/19/2015, 09/20/2016, 09/18/2017, 09/24/2018, 09/24/2019     Influenza, seasonal,quad inj 6-35 mos 10/23/2014     Td,adult,historic,unspecified 12/15/2000     Tdap 11/18/2011     ZOSTER, RECOMBINANT, IM 10/18/2019       ROS A comprehensive review of systems was performed and was otherwise negative    Medications, allergies, and problem list were reviewed and updated    Exam  /90 (Patient Site: Right Arm, Patient Position: Sitting, Cuff Size: Adult Large)   Pulse 72   Ht 5' 6.5\" (1.689 m)   Wt 192 lb (87.1 kg)   BMI 30.53 kg/m    Blood pressure recheck was 150/82 on my check.    Pupils and irises equal and reactive.  Extraocular muscles intact.  External ears and nose exam is normal and tympanic membranes are normal.  Pharynx is not crowded.  Some mild pharyngeal irritation.  No leukoplakia.  No cervical or supraclavicular adenopathy.  No inguinal adenopathy.  No JVD and no carotid bruits.  No thyromegaly or nodularity.  Lungs clear to auscultation with normal respiratory excursion.  Heart is regular without murmur.  +2 posterior tibialis pulses.  No ankle edema.  Feet in good condition.  Gait stable.  Muscle skeletal exam normal.  Abdomen is mildly overweight but nontender no hepatosplenomegaly.  No pulsatile mass.  Atrophic left testicle.  No inguinal hernia.  Prostate is smooth symmetric, " normal prostate.  Skin exam normal.  Gait and mobility exam normal.    Assessment/Plan  1. Healthcare maintenance  I stressed the importance of maintaining regular physical activity and healthy diet now that he is retiring.    He talked about possibly volunteering for [employer], and needing to establish a routine to stay busy in his senior care.    Routine 10-year colonoscopy due July 2021.    He did have his flu shot and recently finished the Shingrix series.    Routine eye exam was done last year, sees every other year.    2. Essential hypertension  Not controlled today for unclear reasons.  Usually well controlled.    Possibly dietary indiscretion with holidays but he denies.  He was given warnings on NSAID use but not currently using NSAIDs.    He checks his blood pressure fairly frequently.  He did not want a make a follow-up appointment unless his blood pressure was truly elevated.  See patient instructions.  - lisinopril (PRINIVIL,ZESTRIL) 2.5 MG tablet; Take 1 tablet (2.5 mg total) by mouth daily.  Dispense: 90 tablet; Refill: 3    3. Hypercholesterolemia  Patient did not want to do a cardiac CT scan or further evaluation.  Goal LDL at least less than 160 but is been much lower than that previously.  Continue on simvastatin 20 mg.  I did discuss liver and muscle toxicity risk as well as other toxicity risk with that medication.  He wishes to continue on current simvastatin.    He was offered a cardiac CT scan for further stratification but he declined today.  - Lipid [place]    He wishes to stay off aspirin to reduce bleeding risk    4. Prostate cancer screening    - PSA (Prostatic-Specific Antigen), Annual Screen    5. Medication monitoring encounter    - Comprehensive Metabolic Panel  - HM2(CBC w/o Differential)    Return in about 1 year (around 1/2/2021) for Annual physical.   Patient Instructions   Check blood pressure on your own.  Blood pressure is running above 135/85, contact me in clinic and I would  have you consider increasing your lisinopril to 5 mg a day.  You would need to follow-up in clinic in 2 to 3 weeks after the increase of lisinopril, however.    If your blood pressure remains well controlled, see me in 1 year for welcome to Medicare physical exam.    Make sure you are getting at least 20 minutes of aerobic exercise 3-4 times a week.  Avoid prolonged sitting.    Continue range of motion exercises for your shoulder on a daily basis.  Avoid ibuprofen and Aleve, as that can affect blood pressure.    Your colonoscopy will be due July 2021        Sheldon Harvey MD        Current Outpatient Medications   Medication Sig Dispense Refill     B complex 11-folic-C-biot-zinc [id number] mg-mg-mcg-mg Tab Take 1 tablet by mouth daily.       co-enzyme Q-10 30 mg capsule Take 30 mg by mouth 2 (two) times a day.        lisinopril (PRINIVIL,ZESTRIL) 2.5 MG tablet Take 1 tablet (2.5 mg total) by mouth daily. 90 tablet 3     MULTIVITAMIN ORAL Take 1 tablet by mouth daily.              OMEGA-3/DHA/EPA/FISH OIL (FISH OIL-OMEGA-3 FATTY ACIDS) 300-1,000 mg capsule Take 2 g by mouth daily.       omeprazole (PRILOSEC) 40 MG capsule Take 1 capsule by mouth once a day as directed Take 30min before the biggest meal of the day  2     phytosterol combination no.1 500 mg cap Take by mouth. Take 2 tablets by mouth       simvastatin (ZOCOR) 20 MG tablet TAKE ONE TABLET BY MOUTH AT BEDTIME  90 tablet 2     triamcinolone (NASACORT) 55 mcg nasal inhaler 2 sprays into each nostril daily as needed.        No current facility-administered medications for this visit.      No Known Allergies  Social History     Tobacco Use     Smoking status: Never Smoker     Smokeless tobacco: Never Used   Substance Use Topics     Alcohol use: Yes     Alcohol/week: 2.0 standard drinks     Types: 2 Standard drinks or equivalent per week     Drug use: Not on file

## 2021-06-05 NOTE — TELEPHONE ENCOUNTER
Left voicemail for patient to return call to clinic. When patient returns call, please give them below message.    Michell Calhoun CMA ............... 10:05 AM, 01/08/20

## 2021-06-05 NOTE — TELEPHONE ENCOUNTER
Tell patient that the blood work he had this week did confirm a new iron deficiency anemia condition.    Hemoglobin stable at 13.1.    He needs a colonoscopy to work-up his iron deficiency anemia.    Have him follow-up with me in early February for reevaluation of his iron deficiency anemia.  Have him do the colonoscopy before then, however.

## 2021-06-06 NOTE — PROGRESS NOTES
[facility] clinic Follow Up Note    Roberto Belle   64 y.o. male    Date of Visit: 2/11/2020    Chief Complaint   Patient presents with     Follow-up     Subjective  Elsie is here to follow-up on iron deficiency anemia.  He was seen for routine physical exam in January 2020.    Hemoglobin was 13.0 with MCV 84.    Previous hemoglobin level in 2018 and previous was 14.9-15.8.    Patient does donate platelets 1-2 times a month, did that 19 times last year.  Last donated January 23 and his hemoglobin was 13.0.  He was then on February 3 and hemoglobin 12.9 and he was unable to donate.    He does not donate red blood cells, just platelets and serum.    He denies any lightheaded dizzy spells.  His blood pressure is been well controlled in the 120-130/70-80 range on lisinopril 2.5 mg a day.    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  Patient feels the anemia issue may have come about after being on omeprazole.    No history of gastric ulcer.  He does not take an iron supplement.    He has been on omeprazole 40 mg a day which has significantly improved his reflux laryngitis and cough.  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  He has another follow-up appointment in March 2 reevaluate and determine long-term plan for Prilosec.    He does not have heartburn or problems swallowing.    He does occasional mild postnasal drip type irritation.    Patient had further evaluation and it did show iron deficiency with iron saturation of 8% and ferritin 23.  Recheck of hemoglobin was stable but still low.  White blood count was 6.3 platelets 231.  Differential showed 16% lymphocytes and 11% monocytes but otherwise differential appeared normal.  RDW borderline at 14.7.    He is never smoked.  No new cough.    No family history of colon cancer.  No abdominal pain.  He underwent colonoscopy last  month.  I did review that report and 2 polyps with tubular adenomas were noted and 5-year follow-up plan.    He just had EGD on February 6.  Patient had the report on his phone.  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  The esophagus biopsy did not show Fraser's.  Small bowel biopsy was negative for sprue.    PMHx:    Past Medical History:   Diagnosis Date     Hypertension      PSHx:    Past Surgical History:   Procedure Laterality Date     UNDESCENDED TESTICLE EXPLORATION Left 1968     Immunizations:   Immunization History   Administered Date(s) Administered     Influenza, inj, historic,unspecified 10/19/2015, 09/20/2016, 09/18/2017, 09/24/2018, 09/24/2019     Influenza, seasonal,quad inj 6-35 mos 10/23/2014     Td,adult,historic,unspecified 12/15/2000     Tdap 11/18/2011     ZOSTER, RECOMBINANT, IM 10/18/2019       ROS A comprehensive review of systems was performed and was otherwise negative    Medications, allergies, and problem list were reviewed and updated    Exam  /78 (Patient Site: Right Arm, Patient Position: Sitting, Cuff Size: Adult Large)   Pulse 72   Wt 190 lb (86.2 kg)   BMI 30.21 kg/m    Heart regular without murmur.  Abdomen nontender.  Lungs clear.  No edema.  Some mild postnasal drip type pharyngitis and mild diffuse irritative pharyngitis.  Mild cobblestoning.    Assessment/Plan  1. Iron deficiency anemia, unspecified iron deficiency anemia type  I suspect this is from regular platelet and serum donation at the [facility].  He did that 19 times last year.  He has tolerated this in the past, but has been on omeprazole since last year.  The omeprazole may have affected iron absorption, which through his iron balance toward iron deficiency.    He does not appear to be having chronic GI blood loss, with negative GI work-up.    Stop donating platelets and serum until recheck in 2 months.  Start iron tablet to replace iron.    If iron  levels and hemoglobin better in 2 months, he can decide if he wishes to stay on the iron pill and resume platelet donation, or stop the iron tablet and not do further platelet donation.    I would have him continue on the omeprazole given the benefit with the reflux laryngitis.      - ferrous sulfate 325 (65 FE) MG tablet; Take 1 tablet (325 mg total) by mouth daily with breakfast. take for 2 months, then recheck hemoglobin and iron labs.  Dispense: 60 tablet; Refill: 0  - HM1(CBC and Differential); Future  - Ferritin; Future  - Iron and Transferrin Iron Binding Capacity; Future    2. Essential hypertension  Controlled on lisinopril 2.5 mg a day    3. Reflux laryngitis  Significant improvement.  Continue on omeprazole 40 mg but that could reduce to 20 mg a day if symptoms are well controlled.    Follow-up with me next January for yearly physical exam if otherwise stable and iron levels improved in 2 months.    Return in about 11 months (around 1/11/2021) for Annual physical.   Patient Instructions   Start an iron tablet daily for 2 months, then schedule lab appointment to check her hemoglobin and iron levels.  I would anticipate her hemoglobin and iron levels being back toward normal on recheck.  At that point you could stop the iron tablet if you are not planning further platelet donation.  If you wish to restart platelet donation, I would have you continue the daily iron tablet.    I would recommend continuing omeprazole on a daily basis to reduce the reflux laryngitis.  Follow-up with your ENT physician in March as planned for that.    If hemoglobin and iron levels returning toward normal and you are otherwise doing okay, I can see you next January for your yearly physical.    Sheldon Harvey MD        Current Outpatient Medications   Medication Sig Dispense Refill     B complex 11-folic-C-biot-zinc [id number] mg-mg-mcg-mg Tab Take 1 tablet by mouth daily.       co-enzyme Q-10 30 mg capsule Take 30 mg by mouth 2  (two) times a day.        lisinopril (PRINIVIL,ZESTRIL) 2.5 MG tablet Take 1 tablet (2.5 mg total) by mouth daily. 90 tablet 3     MULTIVITAMIN ORAL Take 1 tablet by mouth daily.              OMEGA-3/DHA/EPA/FISH OIL (FISH OIL-OMEGA-3 FATTY ACIDS) 300-1,000 mg capsule Take 2 g by mouth daily.       omeprazole (PRILOSEC) 40 MG capsule Take 1 capsule by mouth once a day as directed Take 30min before the biggest meal of the day  2     phytosterol combination no.1 500 mg cap Take by mouth. Take 2 tablets by mouth       simvastatin (ZOCOR) 20 MG tablet TAKE ONE TABLET BY MOUTH AT BEDTIME  90 tablet 2     ferrous sulfate 325 (65 FE) MG tablet Take 1 tablet (325 mg total) by mouth daily with breakfast. take for 2 months, then recheck hemoglobin and iron labs. 60 tablet 0     No current facility-administered medications for this visit.      No Known Allergies  Social History     Tobacco Use     Smoking status: Never Smoker     Smokeless tobacco: Never Used   Substance Use Topics     Alcohol use: Yes     Alcohol/week: 2.0 standard drinks     Types: 2 Standard drinks or equivalent per week     Drug use: Not on file

## 2021-06-06 NOTE — PATIENT INSTRUCTIONS - HE
Start an iron tablet daily for 2 months, then schedule lab appointment to check her hemoglobin and iron levels.  I would anticipate her hemoglobin and iron levels being back toward normal on recheck.  At that point you could stop the iron tablet if you are not planning further platelet donation.  If you wish to restart platelet donation, I would have you continue the daily iron tablet.    I would recommend continuing omeprazole on a daily basis to reduce the reflux laryngitis.  Follow-up with your ENT physician in March as planned for that.    If hemoglobin and iron levels returning toward normal and you are otherwise doing okay, I can see you next January for your yearly physical.

## 2021-06-11 NOTE — TELEPHONE ENCOUNTER
Refill Approved    Rx renewed per Medication Renewal Policy. Medication was last renewed on 12/16/19.    Yuliana Georges, [facility] Triage/Med Refill 9/21/2020     Requested Prescriptions   Pending Prescriptions Disp Refills     simvastatin (ZOCOR) 20 MG tablet [Pharmacy Med Name: Simvastatin Oral Tablet 20 MG] 90 tablet 0     Sig: TAKE ONE TABLET BY MOUTH AT BEDTIME       Statins Refill Protocol (Hmg CoA Reductase Inhibitors) Passed - 9/20/2020  2:00 AM        Passed - PCP or prescribing provider visit in past 12 months      Last office visit with prescriber/PCP: 2/11/2020 Sheldon Harvey MD OR same dept: Visit date not found OR same specialty: 2/11/2020 Sheldon Harvey MD  Last physical: 1/2/2020 Last MTM visit: Visit date not found   Next visit within 3 mo: Visit date not found  Next physical within 3 mo: Visit date not found  Prescriber OR PCP: Sheldon Harvey MD  Last diagnosis associated with med order: 1. Hypercholesterolemia  - simvastatin (ZOCOR) 20 MG tablet [Pharmacy Med Name: Simvastatin Oral Tablet 20 MG]; TAKE ONE TABLET BY MOUTH AT BEDTIME   Dispense: 90 tablet; Refill: 0    If protocol passes may refill for 12 months if within 3 months of last provider visit (or a total of 15 months).

## 2021-06-12 NOTE — TELEPHONE ENCOUNTER
Appointment Request From: Roberto Belle     With Provider: Sheldon Harvey MD [[facility]]     Preferred Date Range: 10/1/2020 - 10/2/2020     Preferred Times: Any Time     Reason for visit: Lab Only Request     Comments:  I think there is an open lab order to recheck iron levels and related blood work.

## 2021-06-12 NOTE — TELEPHONE ENCOUNTER
It looks like patient came in yesterday for labs. Nothing needed.  Michell Calhoun [facility] ............... 9:04 AM, 10/02/20

## 2021-06-13 NOTE — PROGRESS NOTES
"[facility] Follow Up Note    Roberto Belle   61 y.o. male    Date of Visit: 11/3/2017    Chief Complaint   Patient presents with     Follow-up     BP check     Subjective  Shraddha is following up for his hypertension.  His blood pressure is running 113//87.  He is getting some borderline high diastolic numbers.  He otherwise feels well and remains active with elliptical  and light weights at the [facility] on most days.    Sleep apnea was not suspected.  No chest pain or palpitations.    Still just slight right hip pain he gets up from a chair with some ambulation.  Not using ibuprofen or Aleve.    No edema.    Patient prefers to keep the lisinopril at 2.5 mg a day and continue to follow his blood pressure.  PMHx:    Past Medical History:   Diagnosis Date     Hypertension      PSHx:  No past surgical history on file.  Immunizations:   Immunization History   Administered Date(s) Administered     Influenza, inj, historic 09/20/2016     Influenza, seasonal,quad inj 6-35 mos 10/23/2014     Td, historic 12/15/2000     Tdap 11/18/2011       ROS A comprehensive review of systems was performed and was otherwise negative    Medications, allergies, and problem list were reviewed and updated    Exam  /78  Pulse 70  Ht 5' 7\" (1.702 m)  Wt 197 lb (89.4 kg)  SpO2 98%  BMI 30.85 kg/m2  Heart regular no murmur.  No edema.    Assessment/Plan  1. Essential hypertension  Continue 2.5 mg of lisinopril.  Follow-up in the spring, but if blood pressure running above 1 3585, see me sooner as below.    Right hip pain worsens, can see orthopedic clinic.    Cholesterol well-controlled on simvastatin 20 mg.    He was given a handout for ways to wellness to consider.    Return in about 6 months (around 5/3/2018) for Recheck.   Patient Instructions   Continue regular exercise and moderate weight loss.    Continue to follow blood pressure and if it is running above 135/85 more consistently, contact me and I " would have you consider increase of lisinopril to 5 mg a day at that time.  If you do increase the lisinopril, I would want to see you in clinic in 2-3 weeks after that increase.    Six-month follow-up for blood pressure if lisinopril stays the same.    Sheldon Harvey MD    Current Outpatient Prescriptions   Medication Sig Dispense Refill     aspirin 81 mg chewable tablet Chew 81 mg daily.       cholecalciferol, vitamin D3, (VITAMIN D3) 2,000 unit cap Take by mouth.       co-enzyme Q-10 30 mg capsule Take 30 mg by mouth 2 (two) times a day.        lisinopril (PRINIVIL,ZESTRIL) 2.5 MG tablet TAKE 1 TABLET BY MOUTH ONCE DAILY. 90 tablet 3     MULTIVITAMIN ORAL Take by mouth.       OMEGA-3/DHA/EPA/FISH OIL (FISH OIL-OMEGA-3 FATTY ACIDS) 300-1,000 mg capsule Take 2 g by mouth daily.       phytosterol combination no.1 500 mg cap Take by mouth. Take 2 tablets by mouth       simvastatin (ZOCOR) 20 MG tablet TAKE 1 TABLET (20 MG) BY ORAL ROUTE ONCE DAILY 90 tablet 3     triamcinolone (NASACORT) 55 mcg nasal inhaler 2 sprays into each nostril daily as needed.        No current facility-administered medications for this visit.      No Known Allergies  Social History   Substance Use Topics     Smoking status: Never Smoker     Smokeless tobacco: Not on file     Alcohol use Yes

## 2021-06-13 NOTE — PROGRESS NOTES
[facility] Follow Up Note    Roberto Belle   61 y.o. male    Date of Visit: 10/6/2017    Chief Complaint   Patient presents with     Annual Exam     fasting, Rt hip pain, discuss nasal sprays     Subjective  Roberto is here for helping his physical exam.    Also discussed hypertension.    He has had some spring fall postnasal drip seasonal allergies, he breaks out any yeast type rash around his nose and mouth when he uses Flonase, but tolerates Nasacort.  Currently not using anything and no rash.  No wheezing or asthma symptoms.  No cough.    Is moderately overweight, did join the [employer] over last winter, but took time off this summer.  Weight is stable.  Trying to avoid simple carbohydrates and improve diet.    He has an old blood pressure cuff that he just started checking recently, it is running in the 120s over 84 on his checks when he donates platelets.  His cuff did not appear to correlate well today.  It was 146/96 and then 143/98.    Our check was 130/84 and then 140/84 on my check.    No increased lower extremity edema or lightheaded dizzy spells.  No palpitations or chest pain.    Tolerating simvastatin, no new muscle aches or right upper quadrant pain.  Cholesterol well controlled last year.    Family history of heart disease with his father.  He has not had any chest pain or chest pressure no previous cardiac workup.    He has never smoked.  No family history of diabetes.    No abdominal pain and bowels normal.  His abdominal pain symptoms resolved shortly after his December 2016 visit.  Last colonoscopy July 2011 was negative with 10 year follow-up planned.    No urinary problems.    No history of skin cancer, has had previous AK's treated.  He is planning on seeing dermatology for a full body skin exam sometime this winter.    His mother had macular degeneration.  Been 2 years since he seen the eye doctor, but is planning on make an appointment to be seen.  No vision  "changes.        He does have some mild right hip clicking with discomfort when he gets up from a chair for sitting for prolonged periods of time.  Otherwise is ambulating normally.  He likely has some degenerative changes in his right hip but is minimally symptomatic he does not feel he needs to see anyone at this time.    PMHx:    Past Medical History:   Diagnosis Date     Hypertension      PSHx:  No past surgical history on file.  Immunizations:   Immunization History   Administered Date(s) Administered     Influenza, inj, historic 09/20/2016     Influenza, seasonal,quad inj 6-35 mos 10/23/2014     Td, historic 12/15/2000     Tdap 11/18/2011       ROS A comprehensive review of systems was performed and was otherwise negative    Medications, allergies, and problem list were reviewed and updated    Exam  /84  Pulse 69  Ht 5' 7\" (1.702 m)  Wt 194 lb (88 kg)  SpO2 97%  BMI 30.38 kg/m2  Alert and oriented ×3 with normal mood and affect.  Pupils and irises equal and reactive.  Extraocular muscles intact.  No jaundice or conjunctivitis.  External ears and nose exam is normal and tympanic membranes normal.  Pharynx is normal.  Teeth in good condition.  No cervical or supraclavicular or axillary or inguinal adenopathy.  No JVD and no carotid bruits.  Lungs clear to auscultation and normal respiratory excursion.  Heart is regular with no murmur rub or gallop.  Breast exam just shows mild gynecomastia no abnormal mass.  Abdomen is moderately overweight, nontender no hepatosplenomegaly no pulsatile mass.  No ankle edema.  +1 pedal pulses bilaterally and feet in good condition.  Atrophic left testicle unchanged.  Normal right testicle.  No inguinal hernia.  Normal external genitalia.  Prostate exam is smooth and symmetric without nodularity, is mildly enlarged but consistent with age.  Gait is normal.  Muscle skeletal exam normal.  Skin exam does not show any suspicious regions.    Assessment/Plan  1. Healthcare " maintenance  Emphasis placed on improving daily exercise and diet and losing weight.    He does do the elliptical exercise machine for 40 minutes on most days.  Good exercise tolerance and no new cardiac symptoms.    Moderate cardiovascular risk with hypertension and cholesterol and family history.    10 year colonoscopy 2021 if no changes.    He had his flu shot in September.    We will see his ophthalmologist this winter for routine checkup.    He can see his dermatologist for a full skin checkup, but no new lesions noted today.    2. Essential hypertension  Work on diet and exercise.  Check blood pressure more closely, he plans to use a different blood pressure cuff at work.  His current blood pressure cuff does not appear to be correlating well.    Follow-up in 1 month to reevaluate blood pressure and consider increase of lisinopril to 5 mg a day at that time.    He does not feel he has sleep apnea, has not wanted to do a sleep study.  His pharynx is not crowded.    3. Hypercholesterolemia  Simvastatin 20 mg, has been well controlled.  Goal LDL less than 130.  - Lipid Cascade    4. Medication monitoring encounter    - Comprehensive Metabolic Panel  - HM2(CBC w/o Differential)    5. Prostate cancer screening    - PSA (Prostatic-Specific Antigen), Annual Screen    Follow-up of the right hip symptoms worsen    Allergic rhinitis, minimal symptoms now, can use over-the-counter Nasacort if needed    Return in about 4 weeks (around 11/3/2017) for Recheck.   Patient Instructions   Work on diet and exercise.    Check blood pressure closely and bring results to next visit.  Get new blood pressure cuff or check blood pressure work.    Follow-up in 1 month, nonfasting, to evaluate your blood pressure and possibly increase lisinopril.  Continue current lisinopril 2.5 mg a day at this time.    Labs today.    Make appointment to see your eye doctor for routine checkup.    Make appointment to see dermatology for regular  full-body checkup.    Sheldon Harvey MD      Current Outpatient Prescriptions   Medication Sig Dispense Refill     aspirin 81 mg chewable tablet Chew 81 mg daily.       cholecalciferol, vitamin D3, (VITAMIN D3) 2,000 unit cap Take by mouth.       co-enzyme Q-10 30 mg capsule Take 30 mg by mouth 2 (two) times a day.        lisinopril (PRINIVIL,ZESTRIL) 2.5 MG tablet TAKE 1 TABLET BY MOUTH ONCE DAILY. 90 tablet 3     MULTIVITAMIN ORAL Take by mouth.       OMEGA-3/DHA/EPA/FISH OIL (FISH OIL-OMEGA-3 FATTY ACIDS) 300-1,000 mg capsule Take 2 g by mouth daily.       phytosterol combination no.1 500 mg cap Take by mouth. Take 2 tablets by mouth       simvastatin (ZOCOR) 20 MG tablet TAKE 1 TABLET (20 MG) BY ORAL ROUTE ONCE DAILY 90 tablet 3     triamcinolone (NASACORT) 55 mcg nasal inhaler 2 sprays into each nostril daily as needed.        No current facility-administered medications for this visit.      No Known Allergies  Social History   Substance Use Topics     Smoking status: Never Smoker     Smokeless tobacco: None     Alcohol use Yes

## 2021-06-14 NOTE — PROGRESS NOTES
Assessment and Plan:   Patient instructions:  Keep looking on your MyChart for the sign up on the COVID-19 vaccine.    You will get a pneumococcal 23 pneumonia vaccine in 1 year at next year's physical.    You will get the Prevnar 13 pneumococcal vaccine today.  It usually does cause a sore shoulder, sometimes a small red rash does develop, which usually resolves in a few days.  Okay for Tylenol after the shot.    If your blood pressure is running less than 110/60 or having lightheaded dizzy spells, contact me to reevaluate your blood pressure and you may not need to take your lisinopril now that you have lost weight.    Continue range of motion exercises for the shoulder pain.  Follow-up with orthopedic clinic as needed.    Follow-up in 1 year for an adult wellness visit physical exam.    Your colonoscopy will be due January 2025    1. Welcome to Medicare preventive visit  Saw ophthalmology last November, no glaucoma or concerns.  Yearly eye exam recommended.  - Electrocardiogram Perform and Read  - Full code  - Pneumococcal conjugate vaccine 13-valent 6wks-17yrs; >50yrs, did tell patient that he will be due for the pneumococcal when he 3 next year.    We discussed COVID-19 vaccine, he will follow my chart to see when that is available.    He had both Shingrix vaccines.  Tdap last July.  Flu shot last September    2. Need for vaccination with 13-polyvalent pneumococcal conjugate vaccine  Given today  - Pneumococcal conjugate vaccine 13-valent 6wks-17yrs; >50yrs    3. History of colonic polyps  January 2022 polyps, 5-year follow-up.  Bowels are normal now.  No family history of colon cancer.    4. Screening for prostate cancer  Prostate is smooth, no nodule.  No prostatism symptoms.  - PSA (Prostatic-Specific Antigen), Annual Screen    5. Iron deficiency anemia, unspecified iron deficiency anemia type  He developed some mild iron deficiency anemia, likely from donating platelets but he is doing at less often  recently.  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    If significant iron deficiency, could consider iron supplement.  - HM2(CBC w/o Differential)  - Iron and Transferrin Iron Binding Capacity  - Ferritin    February 2020 EGD was negative for Fraser's and negative for sprue.    6. Reflux laryngitis  Controlled with omeprazole and wedge pillow.    7. Hypercholesterolemia  Goal LDL less than 130.  Last year his LDL was 67 with an HDL 53.    Consider repeat cardiac CT scan next year to see if he has evidence of coronary calcification.  If he had a coronary calcium score of 0, could reconsider his statin treatment.    I did discuss toxicity risk with statins including muscle toxicity and liver toxicity and other toxicity risk.  Patient does wish to continue on current simvastatin for primary prophylaxis at this time.  He does have a hypertension risk factor.    He denies current myalgias or right upper quadrant pain.  - LDL Cholesterol, Direct      Continue simvastatin 20 mg at this time.  He does not take daily aspirin.    8. Essential hypertension  Controlled at home in the 120s over 70s.  Continue lisinopril 2.5 mg a day.  He has lost 18 pounds since he retired and improve diet and exercise.  He denies orthostasis.  Patient was warned that his weight loss may cause lower blood pressure, monitor blood pressure and if less than 110/60 or lightheaded dizzy spells may need to reevaluate lisinopril.    - Electrocardiogram Perform and Read    EKG read by me does show possible left atrial enlargement and incomplete right bundle branch block.  He denies significant daytime sleepiness and there has not been a history of sleep apnea.  No palpitation or syncope history.    9. Encounter for therapeutic drug monitoring    - Comprehensive Metabolic Panel    10. Routine general medical examination at a health care facility  Saw  dermatology last September.  AK's treated with 5-FU but no history of skin cancer.    The patient's current medical problems were reviewed.    I have had an Advance Directives discussion with the patient.  The following health maintenance schedule was reviewed with the patient and provided in printed form in the after visit summary:   Health Maintenance   Topic Date Due     HEPATITIS C SCREENING  1955     HIV SCREENING  12/05/1970     ADVANCE CARE PLANNING  11/10/2020     Pneumococcal Vaccine: 65+ Years (1 of 1 - PPSV23) 12/05/2020     MEDICARE ANNUAL WELLNESS VISIT  01/21/2022     FALL RISK ASSESSMENT  01/21/2022     LIPID  01/02/2025     COLORECTAL CANCER SCREENING  01/22/2025     TD 18+ HE  07/13/2030     INFLUENZA VACCINE RULE BASED  Completed     TDAP ADULT ONE TIME DOSE  Completed     ZOSTER VACCINES  Completed     Pneumococcal Vaccine: Pediatrics (0 to 5 Years) and At-Risk Patients (6 to 64 Years)  Aged Out     HEPATITIS B VACCINES  Aged Out        Subjective:   Chief Complaint: Roberto Belle is an 65 y.o. male here for a Welcome to Medicare visit.   HPI: Patient recently retired and improved daily exercise with walking outside.  He has improved diet, has oatmeal in the morning, less eating in the evening.  He does admit he needs to eat his vegetables more often.    No history of diabetes.  He denies daytime sleepiness or sleep apnea symptoms.    No lower extremity edema.    He has not had chest pain or chest pressure or any increasing dyspnea with exertion.    Denies lightheaded dizzy spells, continues on lisinopril 2.5 mg a day.    No right upper quadrant pain or generalized myalgias on simvastatin 20 mg a day.    Denies swallowing issues.    No abdominal pain and bowels are regular without blood in stool.  No urinary symptoms.    No headache complaints.    We did see orthopedic clinic over a year ago for some shoulder pain, chronic.  He is not been doing his exercises regularly.  When he does do his  exercises more regularly he does do better.  He does not take NSAIDs regularly.  He said some mild arthritis of his left thumb when opening jars.  But no numbness or tingling or carpal tunnel type symptoms.    Only rare alcohol.  Is independently with wife.    Review of Systems: No memory or mood issues.  Please see above.  The rest of the review of systems are negative for all systems.    Patient Care Team:  Sheldon Harvey MD as PCP - General  Sheldon Harvey MD as Assigned PCP     Patient Active Problem List   Diagnosis     Epididymitis     Hypercholesterolemia     Essential Hypertension     Fatigue     Taking Medication For  A Long Time     Vaccines Prophylactic Needed Against Influenza     Past Medical History:   Diagnosis Date     Hypertension       Past Surgical History:   Procedure Laterality Date     UNDESCENDED TESTICLE EXPLORATION Left 1968      Family History   Problem Relation Age of Onset     Heart disease Father      Snoring Father      Diabetes Father      Breast cancer Mother      Other Mother         Cancer of the jaw     Macular degeneration Mother      Diabetes type II Brother       Social History     Socioeconomic History     Marital status:      Spouse name: Not on file     Number of children: 2     Years of education: Not on file     Highest education level: Not on file   Occupational History     Occupation:    Social Needs     Financial resource strain: Not on file     Food insecurity     Worry: Not on file     Inability: Not on file     Transportation needs     Medical: Not on file     Non-medical: Not on file   Tobacco Use     Smoking status: Never Smoker     Smokeless tobacco: Never Used   Substance and Sexual Activity     Alcohol use: Yes     Alcohol/week: 2.0 standard drinks     Types: 2 Standard drinks or equivalent per week     Drug use: Not on file     Sexual activity: Not on file   Lifestyle     Physical activity     Days per week: Not on file     Minutes per  session: Not on file     Stress: Not on file   Relationships     Social connections     Talks on phone: Not on file     Gets together: Not on file     Attends Muslim service: Not on file     Active member of club or organization: Not on file     Attends meetings of clubs or organizations: Not on file     Relationship status: Not on file     Intimate partner violence     Fear of current or ex partner: Not on file     Emotionally abused: Not on file     Physically abused: Not on file     Forced sexual activity: Not on file   Other Topics Concern     Not on file   Social History Narrative     Not on file       Current Outpatient Medications   Medication Sig Dispense Refill     B complex 11-folic-C-biot-zinc [id number] mg-mg-mcg-mg Tab Take 1 tablet by mouth every other day.        co-enzyme Q-10 30 mg capsule Take 30 mg by mouth 2 (two) times a day.        ferrous sulfate 325 (65 FE) MG tablet Take 1 tablet (325 mg total) by mouth daily with breakfast. take for 2 months, then recheck hemoglobin and iron labs. (Patient taking differently: Take 1 tablet by mouth every other day. ) 60 tablet 0     lisinopriL (PRINIVIL,ZESTRIL) 2.5 MG tablet Take 1 tablet (2.5 mg total) by mouth daily. 90 tablet 0     MULTIVITAMIN ORAL Take 1 tablet by mouth daily.              OMEGA-3/DHA/EPA/FISH OIL (FISH OIL-OMEGA-3 FATTY ACIDS) 300-1,000 mg capsule Take 2 g by mouth daily.       omeprazole (PRILOSEC) 40 MG capsule Take 1 capsule by mouth once a day as directed Take 30min before the biggest meal of the day  2     phytosterol combination no.1 500 mg cap Take by mouth. Take 2 tablets by mouth       simvastatin (ZOCOR) 20 MG tablet Take 1 tablet (20 mg total) by mouth at bedtime. 90 tablet 1     No current facility-administered medications for this visit.       Objective:   Vital Signs:   Visit Vitals  /84 (Patient Site: Right Arm, Patient Position: Sitting, Cuff Size: Adult Large)   Pulse 64   Temp 96.9  F (36.1  C) (Other)  "Comment (Src): forehead   Ht 5' 6.75\" (1.695 m)   Wt 173 lb 6.4 oz (78.7 kg)   BMI 27.36 kg/m         EKG:  EKG read by me was normal sinus rhythm but possible left atrial enlargement and incomplete right bundle branch block noted.    VisionScreening:   Hearing Screening    125Hz 250Hz 500Hz 1000Hz 2000Hz 3000Hz 4000Hz 6000Hz 8000Hz   Right ear:            Left ear:               Visual Acuity Screening    Right eye Left eye Both eyes   Without correction:      With correction: 20/32 20/25         PHYSICAL EXAM  Alert oriented x3.  Clock face drawing normal and word recall normal.  Mobility exam is normal.  He appears healthy, he is lost significant weight.  Bright normal affect.  Pupils and irises equal and normal.  Extraocular muscles intact.  No jaundice or conjunctivitis.  External ears and nose exam is normal.  Tympanic membranes are normal.  No cervical or supraclavicular adenopathy.  No JVD and no carotid bruits.  No thyromegaly or nodularity.  Lungs are clear to auscultation with normal respiratory excursion.  Spine is straight.  Skin exam shows scars from sebaceous cyst removal of his back x2, healed well.  No other new suspicious skin lesions noted.  Heart is regular without murmur gallop.  +1 pedal pulses bilaterally and no ankle edema.  Abdomen nontender nondistended.  No hepatosplenomegaly.  Liver edge was normal.  No pulsatile abdominal mass.  Prostate exam was smooth symmetric, normal prostate without nodule.  He declined anterior  exam.    Assessment Results 1/21/2021   Activities of Daily Living No help needed   Instrumental Activities of Daily Living No help needed   Mini Cog Total Score 5   Some recent data might be hidden     A Mini Cog score of 0-2 suggests the possibility of dementia, score of 3-5 suggests no dementia    Identified Health Risks:     The patient was counseled and encouraged to consider modifying their diet and eating habits. He was provided with information on recommended " healthy diet options.  Patient's advanced directive was discussed and I am comfortable with the patient's wishes.

## 2021-06-14 NOTE — TELEPHONE ENCOUNTER
RN cannot approve Refill Request    RN can NOT refill this medication PCP messaged that patient is overdue for Labs. Last office visit: 2/11/2020 Sheldon Harvey MD Last Physical: 1/2/2020 Last MTM visit: Visit date not found Last visit same specialty: 2/11/2020 Sheldon Harvey MD.  Next visit within 3 mo: Visit date not found  Next physical within 3 mo: Visit date not found      Laly Ramos, [facility] Triage/Med Refill 1/17/2021    Requested Prescriptions   Pending Prescriptions Disp Refills     lisinopriL (PRINIVIL,ZESTRIL) 2.5 MG tablet [Pharmacy Med Name: Lisinopril Oral Tablet 2.5 MG] 90 tablet 0     Sig: Take 1 tablet (2.5 mg total) by mouth daily.       Ace Inhibitors Refill Protocol Failed - 1/17/2021  2:00 AM        Failed - Serum Potassium in past 12 months     No results found for: LN-POTASSIUM          Failed - Serum Creatinine in past 12 months     Creatinine   Date Value Ref Range Status   01/02/2020 0.91 0.70 - 1.30 mg/dL Final             Passed - PCP or prescribing provider visit in past 12 months       Last office visit with prescriber/PCP: 2/11/2020 Sheldon Harvey MD OR same dept: 2/11/2020 Sheldon Harvey MD OR same specialty: 2/11/2020 Sheldon Harvey MD  Last physical: 1/2/2020 Last MTM visit: Visit date not found   Next visit within 3 mo: Visit date not found  Next physical within 3 mo: Visit date not found  Prescriber OR PCP: Sheldon Harvey MD  Last diagnosis associated with med order: 1. Essential hypertension  - lisinopriL (PRINIVIL,ZESTRIL) 2.5 MG tablet [Pharmacy Med Name: Lisinopril Oral Tablet 2.5 MG]; Take 1 tablet (2.5 mg total) by mouth daily.  Dispense: 90 tablet; Refill: 0    If protocol passes may refill for 12 months if within 3 months of last provider visit (or a total of 15 months).             Passed - Blood pressure filed in past 12 months     BP Readings from Last 1 Encounters:   07/13/20 (!) 165/91

## 2021-06-16 NOTE — TELEPHONE ENCOUNTER
Please update chart    [facility] Vaccination    1st- 2/24 Lot # [id number]    2nd- 3/17 Lot # [id number]

## 2021-06-16 NOTE — TELEPHONE ENCOUNTER
Refill Approved    Rx renewed per Medication Renewal Policy. Medication was last renewed on 1/18/21.    Gopal Perdomo, [facility] Triage/Med Refill 4/21/2021     Requested Prescriptions   Pending Prescriptions Disp Refills     lisinopriL (PRINIVIL,ZESTRIL) 2.5 MG tablet [Pharmacy Med Name: Lisinopril Oral Tablet 2.5 MG] 90 tablet 0     Sig: Take 1 tablet (2.5 mg total) by mouth daily.       Ace Inhibitors Refill Protocol Passed - 4/20/2021  4:34 PM        Passed - PCP or prescribing provider visit in past 12 months       Last office visit with prescriber/PCP: 2/11/2020 Sheldon Harvey MD OR same dept: Visit date not found OR same specialty: 2/11/2020 Sheldon Harvey MD  Last physical: 1/21/2021 Last MTM visit: Visit date not found   Next visit within 3 mo: Visit date not found  Next physical within 3 mo: Visit date not found  Prescriber OR PCP: Sheldon Harvey MD  Last diagnosis associated with med order: 1. Essential hypertension  - lisinopriL (PRINIVIL,ZESTRIL) 2.5 MG tablet [Pharmacy Med Name: Lisinopril Oral Tablet 2.5 MG]; Take 1 tablet (2.5 mg total) by mouth daily.  Dispense: 90 tablet; Refill: 0    If protocol passes may refill for 12 months if within 3 months of last provider visit (or a total of 15 months).             Passed - Serum Potassium in past 12 months     Lab Results   Component Value Date    Potassium 4.2 01/21/2021             Passed - Blood pressure filed in past 12 months     BP Readings from Last 1 Encounters:   01/21/21 126/84             Passed - Serum Creatinine in past 12 months     Creatinine   Date Value Ref Range Status   01/21/2021 0.84 0.70 - 1.30 mg/dL Final

## 2021-06-16 NOTE — TELEPHONE ENCOUNTER
Telephone Encounter by Raysa Larios LPN at 1/8/2020 11:36 AM     Author: Raysa Larios LPN Service: -- Author Type: Licensed Nurse    Filed: 1/8/2020 11:37 AM Encounter Date: 1/7/2020 Status: Signed    : Raysa Larios LPN (Licensed Nurse)       Patient Returning Call  Reason for call:  Message from clinic  Information relayed to patient: Sheldon Harvey MD           4:51 PM   Note      Tell patient that the blood work he had this week did confirm a new iron deficiency anemia condition.     Hemoglobin stable at 13.1.     He needs a colonoscopy to work-up his iron deficiency anemia.     Have him follow-up with me in early February for reevaluation of his iron deficiency anemia.  Have him do the colonoscopy before then, however.          Patient has additional questions:  No  If YES, what are your questions/concerns:  N/A  Okay to leave a detailed message?: No call back needed

## 2021-06-18 NOTE — PATIENT INSTRUCTIONS - HE
Patient Instructions by Sheldon Harvey MD at 1/21/2021  3:00 PM     Author: Shedlon Harvey MD Service: -- Author Type: Physician    Filed: 1/21/2021  3:46 PM Encounter Date: 1/21/2021 Status: Addendum    : Sheldon Harvey MD (Physician)    Related Notes: Original Note by Sheldon Harvey MD (Physician) filed at 1/21/2021  3:45 PM       Keep looking on your MyChart for the sign up on the COVID-19 vaccine.    You will get a pneumococcal 23 pneumonia vaccine in 1 year at next year's physical.    You will get the Prevnar 13 pneumococcal vaccine today.  It usually does cause a sore shoulder, sometimes a small red rash does develop, which usually resolves in a few days.  Okay for Tylenol after the shot.    If your blood pressure is running less than 110/60 or having lightheaded dizzy spells, contact me to reevaluate your blood pressure and you may not need to take your lisinopril now that you have lost weight.    Continue range of motion exercises for the shoulder pain.  Follow-up with orthopedic clinic as needed.    Follow-up in 1 year for an adult wellness visit physical exam.    Your colonoscopy will be due January 2025    Patient Education   Understanding USDA MyPlate  The USDA (US Department of Agriculture) has guidelines to help you make healthy food choices. These are called MyPlate. MyPlate shows the food groups that make up healthy meals using the image of a place setting. Before you eat, think about the healthiest choices for what to put onto your plate or into your cup or bowl. To learn more about building a healthy plate, visit www.choosemyplate.gov.       The Food Groups    Fruits: Any fruit or 100% fruit juice counts as part of the Fruit Group. Fruits may be fresh, canned, frozen, or dried, and may be whole, cut-up, or pureed. Make half your plate fruits and vegetables.    Vegetables: Any vegetable or 100% vegetable juice counts as a member of the Vegetable Group. Vegetables may be fresh, frozen,  canned, or dried. They can be served raw or cooked and may be whole, cut-up, or mashed. Make half your plate fruits and vegetables.     Grains: All foods made from grains are part of the Grains Group. These include wheat, rice, oats, cornmeal, and barley such as bread, pasta, oatmeal, cereal, tortillas, and grits. Grains should be no more than a quarter of your plate. At least half of your grains should be whole grains.    Protein: This group includes meat, poultry, seafood, beans and peas, eggs, processed soy products (like tofu), nuts (including nut butters), and seeds. Make protein choices no more than a quarter of your plate. Meat and poultry choices should be lean or low fat.    Dairy: All fluid milk products and foods made from milk that contain calcium, like yogurt and cheese are part of the Dairy Group. (Foods that have little calcium, such as cream, butter, and cream cheese, are not part of the group.) Most dairy choices should be low-fat or fat-free.    Oils: These are fats that are liquid at room temperature. They include canola, corn, olive, soybean, and sunflower oil. Foods that are mainly oil include mayonnaise, certain salad dressings, and soft margarines. You should have only 5 to 7 teaspoons of oils a day. You probably already get this much from the food you eat.  Use VitaFlavorer to Help Build Your Meals  The SuperTracker can help you plan and track your meals and activity. You can look up individual foods to see or compare their nutritional value. You can get guidelines for what and how much you should eat. You can compare your food choices. And you can assess personal physical activities and see ways you can improve. Go to www.CFBankplate.gov/supertracker/.    3117-6911 [employer]. [address] [place] [address], [place] [address]. All rights reserved. This information is not intended as a substitute for professional medical care. Always follow your healthcare professional's  instructions.             Advance Directive  Patients advance directive was discussed and I am comfortable with the patients wishes.  Patient Education   Personalized Prevention Plan  You are due for the preventive services outlined below.  Your care team is available to assist you in scheduling these services.  If you have already completed any of these items, please share that information with your care team to update in your medical record.  Health Maintenance   Topic Date Due   ? HEPATITIS C SCREENING  1955   ? HIV SCREENING  12/05/1970   ? Pneumococcal Vaccine: 65+ Years (1 of 1 - PPSV23) 12/05/2020   ? MEDICARE ANNUAL WELLNESS VISIT  01/21/2022   ? FALL RISK ASSESSMENT  01/21/2022   ? LIPID  01/02/2025   ? COLORECTAL CANCER SCREENING  01/22/2025   ? ADVANCE CARE PLANNING  01/21/2026   ? TD 18+ HE  07/13/2030   ? INFLUENZA VACCINE RULE BASED  Completed   ? TDAP ADULT ONE TIME DOSE  Completed   ? ZOSTER VACCINES  Completed   ? Pneumococcal Vaccine: Pediatrics (0 to 5 Years) and At-Risk Patients (6 to 64 Years)  Aged Out   ? HEPATITIS B VACCINES  Aged Out

## 2021-06-20 NOTE — LETTER
Letter by Sheldon Harvey MD at      Author: Sheldon Harvey MD Service: -- Author Type: --    Filed:  Encounter Date: 1/3/2020 Status: Signed         Roberto Belle  [address] [place] [address]             January 3, 2020         Dear Mr. Belle,    Below are the results from your recent visit:    Resulted Orders   Comprehensive Metabolic Panel   Result Value Ref Range    Sodium 139 136 - 145 mmol/L    Potassium 4.0 3.5 - 5.0 mmol/L    Chloride 104 98 - 107 mmol/L    CO2 23 22 - 31 mmol/L    Anion Gap, Calculation 12 5 - 18 mmol/L    Glucose 85 70 - 125 mg/dL    BUN 16 8 - 22 mg/dL    Creatinine 0.91 0.70 - 1.30 mg/dL    GFR MDRD Af Amer >60 >60 mL/min/1.73m2    GFR MDRD Non Af Amer >60 >60 mL/min/1.73m2    Bilirubin, Total 0.7 0.0 - 1.0 mg/dL    Calcium 10.0 8.5 - 10.5 mg/dL    Protein, Total 7.1 6.0 - 8.0 g/dL    Albumin 4.3 3.5 - 5.0 g/dL    Alkaline Phosphatase 84 45 - 120 U/L    AST 30 0 - 40 U/L    ALT 22 0 - 45 U/L    Narrative    Fasting Glucose reference range is 70-99 mg/dL per  American Diabetes Association (ADA) guidelines.   HM2(CBC w/o Differential)   Result Value Ref Range    WBC 7.9 4.0 - 11.0 thou/uL    RBC 4.88 4.40 - 6.20 mill/uL    Hemoglobin 13.0 (L) 14.0 - 18.0 g/dL    Hematocrit 40.8 40.0 - 54.0 %    MCV 84 80 - 100 fL    MCH 26.6 (L) 27.0 - 34.0 pg    MCHC 31.9 (L) 32.0 - 36.0 g/dL    RDW 14.2 11.0 - 14.5 %    Platelets 261 140 - 440 thou/uL    MPV 7.0 7.0 - 10.0 fL   Lipid Cascade   Result Value Ref Range    Cholesterol 154 <=199 mg/dL    Triglycerides 168 (H) <=149 mg/dL    HDL Cholesterol 53 >=40 mg/dL    LDL Calculated 67 <=129 mg/dL    Patient Fasting > 8hrs? Yes    PSA (Prostatic-Specific Antigen), Annual Screen   Result Value Ref Range    PSA 2.5 0.0 - 4.5 ng/mL    Narrative    Method is Abbott Prostate-Specific Antigen (PSA)  Standard-WHO 1st International (90:10)       Kidney and liver tests are normal.  Blood sugar is normal.    Hemoglobin level is lower for unclear reasons.   Other blood counts are normal.    Please follow-up to be seen in clinic within the next month for further evaluation of anemia.    Excellent cholesterol levels.  Mildly elevated triglycerides.    Prostate test is normal.    Please call with questions or contact us using MyChart.    Sincerely,        Electronically signed by Sheldon Havrey MD

## 2021-06-20 NOTE — LETTER
Letter by Sheldon Harvey MD at      Author: Sheldon Harvey MD Service: -- Author Type: --    Filed:  Encounter Date: 1/7/2020 Status: Signed         Roberto Belle  [address]  [place] [address]             January 7, 2020         Dear Mr. Belle,    Below are the results from your recent visit:    Resulted Orders   Ferritin   Result Value Ref Range    Ferritin 23 (L) 27 - 300 ng/mL   Iron and Transferrin Iron Binding Capacity   Result Value Ref Range    Iron 32 (L) 42 - 175 ug/dL    Transferrin 331 212 - 360 mg/dL    Transferrin Saturation, Calculated 8 (L) 20 - 50 %    Transferrin IBC, Calculated 413 313 - 563 ug/dL   Vitamin B12   Result Value Ref Range    Vitamin B-12 613 213 - 816 pg/mL   HM1 (CBC with Diff)   Result Value Ref Range    WBC 6.3 4.0 - 11.0 thou/uL    RBC 4.88 4.40 - 6.20 mill/uL    Hemoglobin 13.1 (L) 14.0 - 18.0 g/dL    Hematocrit 40.5 40.0 - 54.0 %    MCV 83 80 - 100 fL    MCH 26.9 (L) 27.0 - 34.0 pg    MCHC 32.4 32.0 - 36.0 g/dL    RDW 14.7 (H) 11.0 - 14.5 %    Platelets 231 140 - 440 thou/uL    MPV 7.7 7.0 - 10.0 fL    Neutrophils % 67 50 - 70 %    Lymphocytes % 16 (L) 20 - 40 %    Monocytes % 11 (H) 2 - 10 %    Eosinophils % 5 0 - 6 %    Basophils % 1 0 - 2 %    Neutrophils Absolute 4.2 2.0 - 7.7 thou/uL    Lymphocytes Absolute 1.0 0.8 - 4.4 thou/uL    Monocytes Absolute 0.7 0.0 - 0.9 thou/uL    Eosinophils Absolute 0.3 0.0 - 0.4 thou/uL    Basophils Absolute 0.0 0.0 - 0.2 thou/uL       You do have iron deficiency anemia.  Hemoglobin is stable.    You will need evaluation of your new anemia condition.  You will need a colonoscopy this month to work-up the iron deficiency anemia to evaluate source of chronic blood loss.  An order for colonoscopy has been placed, you should have been contacted to set that up.    See me in early February to follow-up on this anemia issue.    Please call with questions or contact us using [facility].    Sincerely,        Electronically signed by Sheldon Harvey,  MD

## 2021-06-21 NOTE — PROGRESS NOTES
[facility] clinic Follow Up Note    Roberto Belle   62 y.o. male    Date of Visit: 11/12/2018    Chief Complaint   Patient presents with     Annual Exam     Subjective  Roberto is here for health maintenance physical exam.  He has hypertension mild that is adequately controlled with 2.5 mg of lisinopril was started 1 year ago.  He does check blood pressures every few weeks when he goes in to donate platelets.  Last week was 122/68 and the time before that was 130/86.  He denies lightheaded dizzy spells.    Still tolerating simvastatin 20 mg.  No generalized myalgias right upper quadrant pain or jaundice.    Strong family history of coronary disease with father.  Patient is never smoked.  No history of diabetes.    No epigastric pain or bleeding with the low-dose aspirin.    No chest pain or chest pressure.    Still goes to the gym 2-3 times a week and yoga once a week.  Good exertional ability.  No chest pain or chest pressure.  No palpitations or fainting spells.    He is never smoked.  He has some postnasal drip, but not acting up now.  Does use a nasal steroid.  Occasional over-the-counter Allegra.  I did warn patient today did not use a decongestant.    He saw dermatology earlier this spring, no history of skin cancer just AK's.  Skin lesions now.    He saw ophthalmology the spring of this year, no problems.  Seen every 2 years.    Colonoscopy July 2011 was negative.  He has a 10-year follow-up plan.  Bowels are normal and no blood in stool.  No abdominal pain.    Urinating normally.  PSA was 2.0 last year.    No headache issues.  No swallowing problems or dysphasia.    Diabetes fairly good, mildly overweight.    PMHx:    Past Medical History:   Diagnosis Date     Hypertension      PSHx:    Past Surgical History:   Procedure Laterality Date     UNDESCENDED TESTICLE EXPLORATION Left 1968     Immunizations:   Immunization History   Administered Date(s) Administered     Influenza, inj, historic,unspecified  "10/19/2015, 09/20/2016, 09/18/2017     Influenza, seasonal,quad inj 6-35 mos 10/23/2014     Td,adult,historic,unspecified 12/15/2000     Tdap 11/18/2011       ROS A comprehensive review of systems was performed and was otherwise negative    Medications, allergies, and problem list were reviewed and updated    Exam  /84 (Patient Site: Right Arm, Patient Position: Sitting, Cuff Size: Adult Large)   Pulse 76   Ht 5' 6.5\" (1.689 m)   Wt 193 lb 4.8 oz (87.7 kg)   BMI 30.73 kg/m    Moderately obese male.  Alert and oriented x3 with normal mood and affect.  Pupils and irises equal and reactive.  Extraocular muscles intact.  External ears and nose exam is normal and normal tympanic membranes.  Pharynx did show some mild postnasal drip type pharyngitis without exudate.  No leukoplakia.  Teeth in good condition.  No cervical or supraclavicular or axillary or inguinal adenopathy.  No JVD and no carotid bruits.  No thyromegaly or nodularity.  Lungs are clear to auscultation with normal respiratory excursion heart is regular with no murmur rub or gallop.  +2 pedal pulses bilaterally and no ankle edema.  Feet in good condition.  Abdomen is nontender no hepatosplenomegaly and no pulsatile mass.  Atrophic left testicle and normal right testicle.  No inguinal hernia.  Prostate is smooth, mildly enlarged but no nodule.  Gait is normal.  Skin exam is normal.  Muscle skeletal exam is normal.    Assessment/Plan  1. Healthcare maintenance  His main issue is continuing regular exercise and trying to increase frequency.  Continue to work on diet and weight loss.    He has some moderate cardiovascular risk factors but well controlled hypertension and cholesterol.  Strong family history of heart disease.  He has a good exertional ability and no new chest symptoms.    10-year colonoscopy due July 2021    He is already had the flu shot    Every other year but will be due spring 2020.    He still sees dermatology every spring for " yearly checkup but no history of skin cancer.        2. Essential hypertension  Controlled on low-dose lisinopril 2.5 mg a day    3. Hypercholesterolemia  Goal LDL less than 130.  Plan to continue on same simvastatin.  He is tolerating the low-dose aspirin he does wish to continue it.  Bleeding issues.  - LDL Cholesterol, Direct    4. Medication monitoring encounter    - Comprehensive Metabolic Panel  - HM2(CBC w/o Differential)    5. Prostate cancer screening    - PSA (Prostatic-Specific Antigen), Annual Screen    I discussed the new shingles vaccine, he can get that at a local pharmacy if his insurance covers.    Return in about 1 year (around 11/12/2019) for Annual physical.   Patient Instructions   Continue with regular exercise 3-4 times a week.    Continue current medications.    Goal blood pressure 120/70 to 135/85.    Lab work today.    Follow-up in 1 year for physical exam.    10-year colonoscopy due July 2021    You can consider the new shingles vaccine, Fayerix    Sheldon Harvey MD        Current Outpatient Medications   Medication Sig Dispense Refill     aspirin 81 mg chewable tablet Chew 81 mg daily.       cholecalciferol, vitamin D3, (VITAMIN D3) 2,000 unit cap Take by mouth.       co-enzyme Q-10 30 mg capsule Take 30 mg by mouth 2 (two) times a day.        lisinopril (PRINIVIL,ZESTRIL) 2.5 MG tablet TAKE ONE TABLET BY MOUTH ONE TIME DAILY  90 tablet 1     MULTIVITAMIN ORAL Take by mouth.       OMEGA-3/DHA/EPA/FISH OIL (FISH OIL-OMEGA-3 FATTY ACIDS) 300-1,000 mg capsule Take 2 g by mouth daily.       phytosterol combination no.1 500 mg cap Take by mouth. Take 2 tablets by mouth       simvastatin (ZOCOR) 20 MG tablet Take 1 tablet (20 mg total) by mouth at bedtime. 90 tablet 0     thiamine (VITAMIN B-1) 50 MG tablet Take 50 mg by mouth daily.       triamcinolone (NASACORT) 55 mcg nasal inhaler 2 sprays into each nostril daily as needed.        No current facility-administered medications for this  visit.      No Known Allergies  Social History     Tobacco Use     Smoking status: Never Smoker     Smokeless tobacco: Never Used   Substance Use Topics     Alcohol use: Yes     Drug use: Not on file

## 2021-06-21 NOTE — LETTER
Letter by Sheldon Harvey MD at      Author: Sheldon Harvey MD Service: -- Author Type: --    Filed:  Encounter Date: 1/22/2021 Status: (Other)         Roberto MCDERMOTT Yoshi  [address] [place] [address]             January 22, 2021         Dear Mr. Belle,    Below are the results from your recent visit:    Resulted Orders   Comprehensive Metabolic Panel   Result Value Ref Range    Sodium 141 136 - 145 mmol/L    Potassium 4.2 3.5 - 5.0 mmol/L    Chloride 104 98 - 107 mmol/L    CO2 27 22 - 31 mmol/L    Anion Gap, Calculation 10 5 - 18 mmol/L    Glucose 87 70 - 125 mg/dL    BUN 16 8 - 22 mg/dL    Creatinine 0.84 0.70 - 1.30 mg/dL    GFR MDRD Af Amer >60 >60 mL/min/1.73m2    GFR MDRD Non Af Amer >60 >60 mL/min/1.73m2    Bilirubin, Total 0.8 0.0 - 1.0 mg/dL    Calcium 10.1 8.5 - 10.5 mg/dL    Protein, Total 7.3 6.0 - 8.0 g/dL    Albumin 4.6 3.5 - 5.0 g/dL    Alkaline Phosphatase 84 45 - 120 U/L    AST 25 0 - 40 U/L    ALT 19 0 - 45 U/L    Narrative    Fasting Glucose reference range is 70-99 mg/dL per  American Diabetes Association (ADA) guidelines.   HM2(CBC w/o Differential)   Result Value Ref Range    WBC 7.0 4.0 - 11.0 thou/uL    RBC 5.37 4.40 - 6.20 mill/uL    Hemoglobin 16.6 14.0 - 18.0 g/dL    Hematocrit 49.8 40.0 - 54.0 %    MCV 93 80 - 100 fL    MCH 31.0 27.0 - 34.0 pg    MCHC 33.4 32.0 - 36.0 g/dL    RDW 12.8 11.0 - 14.5 %    Platelets 186 140 - 440 thou/uL    MPV 7.4 7.0 - 10.0 fL   LDL Cholesterol, Direct   Result Value Ref Range    Direct LDL 40 <=129 mg/dl   Iron and Transferrin Iron Binding Capacity   Result Value Ref Range    Iron 113 42 - 175 ug/dL    Transferrin 297 212 - 360 mg/dL    Transferrin Saturation, Calculated 30 20 - 50 %    Transferrin IBC, Calculated 371 313 - 563 ug/dL   Ferritin   Result Value Ref Range    Ferritin 51 27 - 300 ng/mL   PSA (Prostatic-Specific Antigen), Annual Screen   Result Value Ref Range    PSA 3.3 0.0 - 4.5 ng/mL    Narrative    Method is Abbott Prostate-Specific Antigen  (PSA)  Standard-WHO 1st International (90:10)       Prostate test is normal.    Iron levels are normal.    Kidney and liver tests are normal.  Blood sugar is normal.    You have a very low LDL cholesterol level.  Continue on current simvastatin at this time.  I would have you consider a screening cardiac CT scan for calcium score, to see if you have coronary artery calcification.  If this scan does not show any coronary artery calcification, I would have you consider a lower dose of cholesterol medication, since your cholesterol level is so low.  But as you are tolerating the current simvastatin, continue current dose at this time until further evaluation.    Hemoglobin and blood counts are normal.    Please call with questions or contact us using [employer].    Sincerely,        Electronically signed by Sheldon Harvey MD

## 2021-07-01 VITALS
SYSTOLIC BLOOD PRESSURE: 126 MMHG | TEMPERATURE: 96.9 F | HEART RATE: 64 BPM | DIASTOLIC BLOOD PRESSURE: 84 MMHG | BODY MASS INDEX: 27.21 KG/M2 | HEIGHT: 67 IN | WEIGHT: 173.4 LBS

## 2021-07-03 NOTE — ADDENDUM NOTE
Addendum Note by Yoshi Vaz MD at 1/3/2020  2:09 PM     Author: Yoshi Vaz MD Service: -- Author Type: Physician    Filed: 1/3/2020  2:09 PM Encounter Date: 1/3/2020 Status: Signed    : Yoshi Vaz MD (Physician)    Addended by: YOSHI VAZ on: 1/3/2020 02:09 PM        Modules accepted: Orders

## 2021-07-07 ENCOUNTER — HOSPITAL ENCOUNTER (OUTPATIENT)
Dept: CT IMAGING | Facility: CLINIC | Age: 66
Discharge: HOME | End: 2021-07-07
Attending: INTERNAL MEDICINE
Payer: COMMERCIAL

## 2021-07-07 DIAGNOSIS — R10.32 ABDOMINAL PAIN, LEFT LOWER QUADRANT: ICD-10-CM

## 2021-07-08 ENCOUNTER — COMMUNICATION - HEALTHEAST (OUTPATIENT)
Dept: INTERNAL MEDICINE | Facility: CLINIC | Age: 66
End: 2021-07-08

## 2021-07-08 ENCOUNTER — SURGERY - HEALTHEAST (OUTPATIENT)
Dept: SURGERY | Facility: AMBULATORY SURGERY CENTER | Age: 66
End: 2021-07-08

## 2021-07-08 DIAGNOSIS — N20.0 NEPHROLITHIASIS: ICD-10-CM

## 2021-07-08 DIAGNOSIS — N39.0 URINARY TRACT INFECTION WITHOUT HEMATURIA, SITE UNSPECIFIED: ICD-10-CM

## 2021-07-08 RX ORDER — CEFUROXIME AXETIL 500 MG/1
500 TABLET ORAL 2 TIMES DAILY
Qty: 14 TABLET | Refills: 0 | Status: SHIPPED | OUTPATIENT
Start: 2021-07-08 | End: 2021-07-15

## 2021-07-08 NOTE — TELEPHONE ENCOUNTER
Telephone Encounter by Martha Villasenor CMA at 7/8/2021  4:23 PM     Author: Martha Villasenor CMA Service: -- Author Type: Certified Medical Assistant    Filed: 7/8/2021  4:23 PM Encounter Date: 7/8/2021 Status: Signed    : Martha Villasenor CMA (Certified Medical Assistant)       The patient was informed of the clinician message and verbalized understanding.

## 2021-07-08 NOTE — TELEPHONE ENCOUNTER
Telephone Encounter by Martha Villasenor CMA at 7/8/2021  8:24 AM     Author: Martha Villasenor CMA Service: -- Author Type: Certified Medical Assistant    Filed: 7/8/2021  8:25 AM Encounter Date: 7/8/2021 Status: Signed    : Martha Villasenor CMA (Certified Medical Assistant)       The patient was informed of the clinician message and verbalized understanding. He actually already saw his result note on [employer]. He stated that he is scheduling a virtual visit with the [facility] at the moment and will call us back to schedule a clinic appt for early next week with an available provider.

## 2021-07-08 NOTE — TELEPHONE ENCOUNTER
Telephone Encounter by Meghan Trent at 7/8/2021  3:51 PM     Author: Meghan Trent Service: -- Author Type: Patient Access    Filed: 7/8/2021  3:53 PM Encounter Date: 7/8/2021 Status: Signed    : Meghan Trent (Patient Access)       7-8-21  Patient called stated he feels he doesn't need to come in and see the [facility], pt is following up with the kidney doctor 7-8-21 and there office will call him in a few weeks to scheduled the procedure.  Unless Dr Harvey really wants pt to come in for an appt?  meghan

## 2021-07-08 NOTE — TELEPHONE ENCOUNTER
Telephone Encounter by Sheldon Harvey MD at 7/8/2021  6:45 AM     Author: Sheldon Harvey MD Service: -- Author Type: Physician    Filed: 7/8/2021  6:51 AM Encounter Date: 7/8/2021 Status: Signed    : Sheldon Harvey MD (Physician)       Contact patient.  Patient had a CT scan of abdomen yesterday for chronic abdominal pain.    CT scan shows that he has 2 kidney stones on the right, but unclear if they are the cause of the pain.  There was also some evidence of inflammation in the left renal pelvis, that may be a low-grade urinary infection.    Have patient start Ceftin antibiotic 500 mg twice a day for 7 days.    Have patient follow-up in clinic early next week with nurse practitioner or available provider to make sure this issue is being addressed.    Have patient see urology as soon as possible for kidney stones.

## 2021-07-08 NOTE — TELEPHONE ENCOUNTER
Telephone Encounter by Sheldon Harvey MD at 7/8/2021  4:05 PM     Author: Sheldon Harvey MD Service: -- Author Type: Physician    Filed: 7/8/2021  4:06 PM Encounter Date: 7/8/2021 Status: Signed    : Sheldon Harvey MD (Physician)       Patient can just follow-up with the kidney stone doctor as planned, but can see us in clinic if needed.

## 2021-07-28 ENCOUNTER — PREP FOR PROCEDURE (OUTPATIENT)
Dept: UROLOGY | Facility: CLINIC | Age: 66
End: 2021-07-28

## 2021-07-28 ENCOUNTER — TELEPHONE (OUTPATIENT)
Dept: INTERNAL MEDICINE | Facility: CLINIC | Age: 66
End: 2021-07-28

## 2021-07-28 DIAGNOSIS — N20.1 CALCULUS OF URETER: Primary | ICD-10-CM

## 2021-07-28 RX ORDER — KETOROLAC TROMETHAMINE 30 MG/ML
15 INJECTION, SOLUTION INTRAMUSCULAR; INTRAVENOUS
Status: CANCELLED | OUTPATIENT
Start: 2021-07-28

## 2021-07-28 RX ORDER — GABAPENTIN 100 MG/1
300 CAPSULE ORAL
Status: CANCELLED | OUTPATIENT
Start: 2021-07-28

## 2021-07-28 NOTE — TELEPHONE ENCOUNTER
Reason for call:  Other   Patient called regarding (reason for call): appointment  Additional comments: needs a pre-op.  DOS: 08/06, kidney stones, Dr Michael, [facility]    Phone number to reach patient:  Cell number on file:    Telephone Information:   Mobile [phone]       Best Time:  anytime    Can we leave a detailed message on this number?  YES    Travel screening: Not Applicable

## 2021-08-03 ENCOUNTER — OFFICE VISIT (OUTPATIENT)
Dept: FAMILY MEDICINE | Facility: CLINIC | Age: 66
End: 2021-08-03
Payer: MEDICARE

## 2021-08-03 ENCOUNTER — LAB (OUTPATIENT)
Dept: LAB | Facility: CLINIC | Age: 66
End: 2021-08-03
Attending: UROLOGY
Payer: MEDICARE

## 2021-08-03 VITALS
WEIGHT: 178.9 LBS | SYSTOLIC BLOOD PRESSURE: 130 MMHG | HEIGHT: 67 IN | DIASTOLIC BLOOD PRESSURE: 88 MMHG | HEART RATE: 68 BPM | BODY MASS INDEX: 28.08 KG/M2

## 2021-08-03 DIAGNOSIS — N20.1 CALCULUS OF URETER: ICD-10-CM

## 2021-08-03 DIAGNOSIS — I10 ESSENTIAL HYPERTENSION: Primary | ICD-10-CM

## 2021-08-03 DIAGNOSIS — E78.00 PURE HYPERCHOLESTEROLEMIA: ICD-10-CM

## 2021-08-03 DIAGNOSIS — Z01.818 PREOPERATIVE EXAMINATION: ICD-10-CM

## 2021-08-03 DIAGNOSIS — N20.0 KIDNEY STONE: ICD-10-CM

## 2021-08-03 LAB — SARS-COV-2 RNA RESP QL NAA+PROBE: NEGATIVE

## 2021-08-03 PROCEDURE — U0003 INFECTIOUS AGENT DETECTION BY NUCLEIC ACID (DNA OR RNA); SEVERE ACUTE RESPIRATORY SYNDROME CORONAVIRUS 2 (SARS-COV-2) (CORONAVIRUS DISEASE [COVID-19]), AMPLIFIED PROBE TECHNIQUE, MAKING USE OF HIGH THROUGHPUT TECHNOLOGIES AS DESCRIBED BY CMS-2020-01-R: HCPCS

## 2021-08-03 PROCEDURE — 99214 OFFICE O/P EST MOD 30 MIN: CPT | Performed by: NURSE PRACTITIONER

## 2021-08-03 PROCEDURE — U0005 INFEC AGEN DETEC AMPLI PROBE: HCPCS

## 2021-08-03 ASSESSMENT — MIFFLIN-ST. JEOR: SCORE: 1551.15

## 2021-08-03 NOTE — PATIENT INSTRUCTIONS
Hold all supplements, aspirin and NSAIDs for 7 days prior to surgery.    Do not take your lisinopril in the morning of your procedure     Follow your surgeon's direction on when to stop eating and drinking prior to surgery.    Your surgeon will be managing your pain after your surgery.      Remove all jewelry and metal piercings before your surgery.     Remove nail polish from fingers before surgery.    If you use a CPAP machine, bring this with you to surgery.

## 2021-08-03 NOTE — PROGRESS NOTES
[address] [address]  Phone: [phone]  Fax: [phone]  Primary Provider: Sheldon Harvey  Pre-op Performing Provider: JOSÉ DICKINSON      PREOPERATIVE EVALUATION:  Today's date: 8/3/2021    Roberto Belle is a 65 year old male who presents for a preoperative evaluation.    Surgical Information:  Surgery/Procedure: Cystoureteroscopy w/stone removal and urete stent  Surgery Location: [facility]  Surgeon: Dr. Aneesh Michael  Surgery Date: 8/06/2021  Time of Surgery: 7:00 AM  Where patient plans to recover: At home with family  Fax number for surgical facility: Note does not need to be faxed, will be available electronically in Epic.    Type of Anesthesia Anticipated: to be determined    Assessment & Plan     The proposed surgical procedure is considered LOW risk.    Preoperative examination  No contraindications for planned procedure    Kidney stone  He saw his PCP this past June for some left lower quadrant abdominal pain.  He had a CT scan which revealed a pair of 8, 9 mm left renal pelvic stones.  There is a central cyst or localized hydronephrosis with some calcific debris.  He saw the kidney stone [place], Dr. Michael.  He would like to proceed with stone clearance.    Essential hypertension  Blood pressure controlled on low-dose lisinopril.  He takes a 2.5 mg tablet of the lisinopril daily.  He is going to skip this on the morning of his procedure. He had a CMP checked on 6/22, potassium 4.5, creatinine 0.83    Hypercholesterolemia  Right bundle branch block noted on January 2021 EKG.  Stress test in 2001 normal.  Heart echo in August 2001 normal.  March 2021 cardiac CT scan calcium score 52, on simvastatin.  No chest pain or shortness of breath.  No anginal symptoms.  On simvastatin.  Not on baby aspirin.      Risks and Recommendations:  The patient has the following additional risks and recommendations for  perioperative complications:   - No identified additional risk factors other than previously addressed    Medication Instructions:  As above    RECOMMENDATION:  APPROVAL GIVEN to proceed with proposed procedure, without further diagnostic evaluation.    21 minutes spent on the date of the encounter doing chart review, history and exam, documentation and further activities per the note      Subjective     HPI related to upcoming procedure: He saw his PCP this past June for some left lower quadrant abdominal pain.  He had a CT scan which revealed a pair of 8, 9 mm left renal pelvic stones.  There is a central cyst or localized hydronephrosis with some calcific debris.  He saw the kidney stone [place], Dr. Michael.  He would like to proceed with stone clearance.    Preop Questions 7/29/2021   1. Have you ever had a heart attack or stroke? No   2. Have you ever had surgery on your heart or blood vessels, such as a stent placement, a coronary artery bypass, or surgery on an artery in your head, neck, heart, or legs? No   3. Do you have chest pain with activity? No   4. Do you have a history of  heart failure? No   5. Do you currently have a cold, bronchitis or symptoms of other infection? No   6. Do you have a cough, shortness of breath, or wheezing? No   7. Do you or anyone in your family have previous history of blood clots? No   8. Do you or does anyone in your family have a serious bleeding problem such as prolonged bleeding following surgeries or cuts? No   9. Have you ever had problems with anemia or been told to take iron pills? YES     10. Have you had any abnormal blood loss such as black, tarry or bloody stools? No   11. Have you ever had a blood transfusion? No   12. Are you willing to have a blood transfusion if it is medically needed before, during, or after your surgery? Yes   13. Have you or any of your relatives ever had problems with anesthesia? No   14. Do you have sleep apnea, excessive snoring or  daytime drowsiness? No   15. Do you have any artifical heart valves or other implanted medical devices like a pacemaker, defibrillator, or continuous glucose monitor? No   16. Do you have artificial joints? No   17. Are you allergic to latex? No       Health Care Directive:  Patient does not have a Health Care Directive or Living Will: Discussed advance care planning with patient; however, patient declined at this time.    Preoperative Review of :   reviewed - no record of controlled substances prescribed.      Status of Chronic Conditions:  See problem list for active medical problems.  Problems all longstanding and stable, except as noted/documented.  See ROS for pertinent symptoms related to these conditions.      Review of Systems  CONSTITUTIONAL: NEGATIVE for fever, chills, change in weight  INTEGUMENTARY/SKIN: NEGATIVE for worrisome rashes, moles or lesions  EYES: NEGATIVE for vision changes or irritation  ENT/MOUTH: NEGATIVE for ear, mouth and throat problems  RESP: NEGATIVE for significant cough or SOB  CV: NEGATIVE for chest pain, palpitations or peripheral edema  GI: NEGATIVE for nausea, abdominal pain, heartburn, or change in bowel habits  : NEGATIVE for frequency, dysuria, or hematuria  MUSCULOSKELETAL: NEGATIVE for significant arthralgias or myalgia  NEURO: NEGATIVE for weakness, dizziness or paresthesias  ENDOCRINE: NEGATIVE for temperature intolerance, skin/hair changes  HEME: NEGATIVE for bleeding problems  PSYCHIATRIC: NEGATIVE for changes in mood or affect    Patient Active Problem List    Diagnosis Date Noted     Calculus of ureter 07/28/2021     Priority: Medium     Added automatically from request for surgery [id number]       Epididymitis      Priority: Medium     Created by Conversion  Replacement Utility updated for latest IMO load         Essential Hypertension      Priority: Medium     Created by Conversion  [facility] Annotation: Jan 16 2009  1:39PM - Sheldon Harvey: started    lisinopril   12/08  Replacement Utility updated for latest IMO load         Fatigue 11/10/2014     Priority: Medium     Taking Medication For  A Long Time 11/10/2014     Priority: Medium     Vaccines Prophylactic Needed Against Influenza 11/10/2014     Priority: Medium     Hypercholesterolemia      Priority: Medium     Created by Conversion          Past Medical History:   Diagnosis Date     Hypertension      Past Surgical History:   Procedure Laterality Date     UNDESCENDED TESTICLE EXPLORATION Left 1968     Current Outpatient Medications   Medication Sig Dispense Refill     B complex 11-folic-C-biot-zinc 4-019-430-50 mg-mg-mcg-mg Tab [B COMPLEX 11-FOLIC-C-BIOT-ZINC 9-257-472-50 MG-MG-MCG-MG TAB] Take 1 tablet by mouth every other day.        co-enzyme Q-10 30 mg capsule [CO-ENZYME Q-10 30 MG CAPSULE] Take 30 mg by mouth 2 (two) times a day.        lisinopriL (PRINIVIL,ZESTRIL) 2.5 MG tablet [LISINOPRIL (PRINIVIL,ZESTRIL) 2.5 MG TABLET] Take 1 tablet (2.5 mg total) by mouth daily. 90 tablet 2     loratadine (CLARITIN) 10 mg tablet [LORATADINE (CLARITIN) 10 MG TABLET] Take 10 mg by mouth daily.       MEDICATION CANNOT BE REORDERED - PLEASE MANUALLY REORDER AND DISCONTINUE THE OLD ORDER [PHYTOSTEROL COMBINATION NO.1 500 MG CAP] Take by mouth. Take 2 tablets by mouth       MULTIVITAMIN ORAL [MULTIVITAMIN ORAL] Take 1 tablet by mouth daily.              OMEGA-3/DHA/EPA/FISH OIL (FISH OIL-OMEGA-3 FATTY ACIDS) 300-1,000 mg capsule [OMEGA-3/DHA/EPA/FISH OIL (FISH OIL-OMEGA-3 FATTY ACIDS) 300-1,000 MG CAPSULE] Take 2 g by mouth daily.       simvastatin (ZOCOR) 20 MG tablet [SIMVASTATIN (ZOCOR) 20 MG TABLET] Take 1 tablet (20 mg total) by mouth at bedtime. 90 tablet 3     ferrous sulfate 325 (65 FE) MG tablet [FERROUS SULFATE 325 (65 FE) MG TABLET] Take 1 tablet (325 mg total) by mouth daily with breakfast. take for 2 months, then recheck hemoglobin and iron labs. 60 tablet 0     omeprazole (PRILOSEC) 40 MG capsule  "[OMEPRAZOLE (PRILOSEC) 40 MG CAPSULE] Take 1 capsule by mouth once a day as directed Take 30min before the biggest meal of the day  2       No Known Allergies     Social History     Tobacco Use     Smoking status: Never Smoker     Smokeless tobacco: Never Used   Substance Use Topics     Alcohol use: Yes     Alcohol/week: 2.0 standard drinks     Family History   Problem Relation Age of Onset     Heart Disease Father      Snoring Father      Diabetes Father      Breast Cancer Mother      Other - See Comments Mother         Cancer of the jaw     Macular Degeneration Mother      Diabetes Type 2  Brother      History   Drug Use Not on file         Objective     /88 (BP Location: Right arm, Patient Position: Sitting, Cuff Size: Adult Regular)   Pulse 68   Ht 1.695 m (5' 6.75\")   Wt 81.1 kg (178 lb 14.4 oz)   BMI 28.23 kg/m      Physical Exam    GENERAL APPEARANCE: healthy, alert and no distress     EYES: EOMI,  PERRL     HENT: ear canals and TM's normal and nose and mouth without ulcers or lesions     NECK: no adenopathy, no asymmetry, masses, or scars and thyroid normal to palpation     RESP: lungs clear to auscultation - no rales, rhonchi or wheezes     CV: regular rates and rhythm, normal S1 S2, no S3 or S4 and no murmur, click or rub     ABDOMEN:  soft, nontender, no HSM or masses and bowel sounds normal     MS: extremities normal- no gross deformities noted, no evidence of inflammation in joints, FROM in all extremities.     SKIN: no suspicious lesions or rashes     NEURO: Normal strength and tone, sensory exam grossly normal, mentation intact and speech normal     PSYCH: mentation appears normal. and affect normal/bright     LYMPHATICS: No cervical adenopathy    Recent Labs   Lab Test 06/22/21  1202 01/21/21  1603   HGB 15.7 16.6    186    141   POTASSIUM 4.5 4.2   CR 0.83 0.84        Diagnostics:  As above, see ehr    Revised Cardiac Risk Index (RCRI):  The patient has the following serious " cardiovascular risks for perioperative complications:   - No serious cardiac risks = 0 points     RCRI Interpretation: 0 points: Class I (very low risk - 0.4% complication rate)           Signed Electronically by: Austin Patricio CNP  Copy of this evaluation report is provided to requesting physician.

## 2021-08-03 NOTE — H&P (VIEW-ONLY)
[address] [address]  Phone: [phone]  Fax: [phone]  Primary Provider: Sheldon Harvey  Pre-op Performing Provider: JOSÉ DICKINSON      PREOPERATIVE EVALUATION:  Today's date: 8/3/2021    Roberto Belle is a 65 year old male who presents for a preoperative evaluation.    Surgical Information:  Surgery/Procedure: Cystoureteroscopy w/stone removal and urete stent  Surgery Location: [facility] [place]  Surgeon: Dr. Aneesh Michael  Surgery Date: 8/06/2021  Time of Surgery: 7:00 AM  Where patient plans to recover: At home with family  Fax number for surgical facility: Note does not need to be faxed, will be available electronically in Epic.    Type of Anesthesia Anticipated: to be determined    Assessment & Plan     The proposed surgical procedure is considered LOW risk.    Preoperative examination  No contraindications for planned procedure    Kidney stone  He saw his PCP this past June for some left lower quadrant abdominal pain.  He had a CT scan which revealed a pair of 8, 9 mm left renal pelvic stones.  There is a central cyst or localized hydronephrosis with some calcific debris.  He saw the kidney stone [place], Dr. Michael.  He would like to proceed with stone clearance.    Essential hypertension  Blood pressure controlled on low-dose lisinopril.  He takes a 2.5 mg tablet of the lisinopril daily.  He is going to skip this on the morning of his procedure. He had a CMP checked on 6/22, potassium 4.5, creatinine 0.83    Hypercholesterolemia  Right bundle branch block noted on January 2021 EKG.  Stress test in 2001 normal.  Heart echo in August 2001 normal.  March 2021 cardiac CT scan calcium score 52, on simvastatin.  No chest pain or shortness of breath.  No anginal symptoms.  On simvastatin.  Not on baby aspirin.      Risks and Recommendations:  The patient has the following additional risks and recommendations for  perioperative complications:   - No identified additional risk factors other than previously addressed    Medication Instructions:  As above    RECOMMENDATION:  APPROVAL GIVEN to proceed with proposed procedure, without further diagnostic evaluation.    21 minutes spent on the date of the encounter doing chart review, history and exam, documentation and further activities per the note      Subjective     HPI related to upcoming procedure: He saw his PCP this past June for some left lower quadrant abdominal pain.  He had a CT scan which revealed a pair of 8, 9 mm left renal pelvic stones.  There is a central cyst or localized hydronephrosis with some calcific debris.  He saw the kidney stone [place], Dr. Michael.  He would like to proceed with stone clearance.    Preop Questions 7/29/2021   1. Have you ever had a heart attack or stroke? No   2. Have you ever had surgery on your heart or blood vessels, such as a stent placement, a coronary artery bypass, or surgery on an artery in your head, neck, heart, or legs? No   3. Do you have chest pain with activity? No   4. Do you have a history of  heart failure? No   5. Do you currently have a cold, bronchitis or symptoms of other infection? No   6. Do you have a cough, shortness of breath, or wheezing? No   7. Do you or anyone in your family have previous history of blood clots? No   8. Do you or does anyone in your family have a serious bleeding problem such as prolonged bleeding following surgeries or cuts? No   9. Have you ever had problems with anemia or been told to take iron pills? YES     10. Have you had any abnormal blood loss such as black, tarry or bloody stools? No   11. Have you ever had a blood transfusion? No   12. Are you willing to have a blood transfusion if it is medically needed before, during, or after your surgery? Yes   13. Have you or any of your relatives ever had problems with anesthesia? No   14. Do you have sleep apnea, excessive snoring or  daytime drowsiness? No   15. Do you have any artifical heart valves or other implanted medical devices like a pacemaker, defibrillator, or continuous glucose monitor? No   16. Do you have artificial joints? No   17. Are you allergic to latex? No       Health Care Directive:  Patient does not have a Health Care Directive or Living Will: Discussed advance care planning with patient; however, patient declined at this time.    Preoperative Review of :   reviewed - no record of controlled substances prescribed.      Status of Chronic Conditions:  See problem list for active medical problems.  Problems all longstanding and stable, except as noted/documented.  See ROS for pertinent symptoms related to these conditions.      Review of Systems  CONSTITUTIONAL: NEGATIVE for fever, chills, change in weight  INTEGUMENTARY/SKIN: NEGATIVE for worrisome rashes, moles or lesions  EYES: NEGATIVE for vision changes or irritation  ENT/MOUTH: NEGATIVE for ear, mouth and throat problems  RESP: NEGATIVE for significant cough or SOB  CV: NEGATIVE for chest pain, palpitations or peripheral edema  GI: NEGATIVE for nausea, abdominal pain, heartburn, or change in bowel habits  : NEGATIVE for frequency, dysuria, or hematuria  MUSCULOSKELETAL: NEGATIVE for significant arthralgias or myalgia  NEURO: NEGATIVE for weakness, dizziness or paresthesias  ENDOCRINE: NEGATIVE for temperature intolerance, skin/hair changes  HEME: NEGATIVE for bleeding problems  PSYCHIATRIC: NEGATIVE for changes in mood or affect    Patient Active Problem List    Diagnosis Date Noted     Calculus of ureter 07/28/2021     Priority: Medium     Added automatically from request for surgery [id number]       Epididymitis      Priority: Medium     Created by Conversion  Replacement Utility updated for latest IMO load         Essential Hypertension      Priority: Medium     Created by Conversion  [facility] Annotation: Jan 16 2009  1:39PM - Sheldon Harvey: started    lisinopril   12/08  Replacement Utility updated for latest IMO load         Fatigue 11/10/2014     Priority: Medium     Taking Medication For  A Long Time 11/10/2014     Priority: Medium     Vaccines Prophylactic Needed Against Influenza 11/10/2014     Priority: Medium     Hypercholesterolemia      Priority: Medium     Created by Conversion          Past Medical History:   Diagnosis Date     Hypertension      Past Surgical History:   Procedure Laterality Date     UNDESCENDED TESTICLE EXPLORATION Left 1968     Current Outpatient Medications   Medication Sig Dispense Refill     B complex 11-folic-C-biot-zinc 4-182-262-50 mg-mg-mcg-mg Tab [B COMPLEX 11-FOLIC-C-BIOT-ZINC 3-867-846-50 MG-MG-MCG-MG TAB] Take 1 tablet by mouth every other day.        co-enzyme Q-10 30 mg capsule [CO-ENZYME Q-10 30 MG CAPSULE] Take 30 mg by mouth 2 (two) times a day.        lisinopriL (PRINIVIL,ZESTRIL) 2.5 MG tablet [LISINOPRIL (PRINIVIL,ZESTRIL) 2.5 MG TABLET] Take 1 tablet (2.5 mg total) by mouth daily. 90 tablet 2     loratadine (CLARITIN) 10 mg tablet [LORATADINE (CLARITIN) 10 MG TABLET] Take 10 mg by mouth daily.       MEDICATION CANNOT BE REORDERED - PLEASE MANUALLY REORDER AND DISCONTINUE THE OLD ORDER [PHYTOSTEROL COMBINATION NO.1 500 MG CAP] Take by mouth. Take 2 tablets by mouth       MULTIVITAMIN ORAL [MULTIVITAMIN ORAL] Take 1 tablet by mouth daily.              OMEGA-3/DHA/EPA/FISH OIL (FISH OIL-OMEGA-3 FATTY ACIDS) 300-1,000 mg capsule [OMEGA-3/DHA/EPA/FISH OIL (FISH OIL-OMEGA-3 FATTY ACIDS) 300-1,000 MG CAPSULE] Take 2 g by mouth daily.       simvastatin (ZOCOR) 20 MG tablet [SIMVASTATIN (ZOCOR) 20 MG TABLET] Take 1 tablet (20 mg total) by mouth at bedtime. 90 tablet 3     ferrous sulfate 325 (65 FE) MG tablet [FERROUS SULFATE 325 (65 FE) MG TABLET] Take 1 tablet (325 mg total) by mouth daily with breakfast. take for 2 months, then recheck hemoglobin and iron labs. 60 tablet 0     omeprazole (PRILOSEC) 40 MG capsule  "[OMEPRAZOLE (PRILOSEC) 40 MG CAPSULE] Take 1 capsule by mouth once a day as directed Take 30min before the biggest meal of the day  2       No Known Allergies     Social History     Tobacco Use     Smoking status: Never Smoker     Smokeless tobacco: Never Used   Substance Use Topics     Alcohol use: Yes     Alcohol/week: 2.0 standard drinks     Family History   Problem Relation Age of Onset     Heart Disease Father      Snoring Father      Diabetes Father      Breast Cancer Mother      Other - See Comments Mother         Cancer of the jaw     Macular Degeneration Mother      Diabetes Type 2  Brother      History   Drug Use Not on file         Objective     /88 (BP Location: Right arm, Patient Position: Sitting, Cuff Size: Adult Regular)   Pulse 68   Ht 1.695 m (5' 6.75\")   Wt 81.1 kg (178 lb 14.4 oz)   BMI 28.23 kg/m      Physical Exam    GENERAL APPEARANCE: healthy, alert and no distress     EYES: EOMI,  PERRL     HENT: ear canals and TM's normal and nose and mouth without ulcers or lesions     NECK: no adenopathy, no asymmetry, masses, or scars and thyroid normal to palpation     RESP: lungs clear to auscultation - no rales, rhonchi or wheezes     CV: regular rates and rhythm, normal S1 S2, no S3 or S4 and no murmur, click or rub     ABDOMEN:  soft, nontender, no HSM or masses and bowel sounds normal     MS: extremities normal- no gross deformities noted, no evidence of inflammation in joints, FROM in all extremities.     SKIN: no suspicious lesions or rashes     NEURO: Normal strength and tone, sensory exam grossly normal, mentation intact and speech normal     PSYCH: mentation appears normal. and affect normal/bright     LYMPHATICS: No cervical adenopathy    Recent Labs   Lab Test 06/22/21  1202 01/21/21  1603   HGB 15.7 16.6    186    141   POTASSIUM 4.5 4.2   CR 0.83 0.84        Diagnostics:  As above, see ehr    Revised Cardiac Risk Index (RCRI):  The patient has the following serious " cardiovascular risks for perioperative complications:   - No serious cardiac risks = 0 points     RCRI Interpretation: 0 points: Class I (very low risk - 0.4% complication rate)           Signed Electronically by: Austin Patricio CNP  Copy of this evaluation report is provided to requesting physician.

## 2021-08-03 NOTE — H&P (VIEW-ONLY)
[address] [address]  Phone: [phone]  Fax: [phone]  Primary Provider: Sheldon Harvey  Pre-op Performing Provider: JOSÉ DICKINSON      PREOPERATIVE EVALUATION:  Today's date: 8/3/2021    Roberto Belle is a 65 year old male who presents for a preoperative evaluation.    Surgical Information:  Surgery/Procedure: Cystoureteroscopy w/stone removal and urete stent  Surgery Location: [facility]  Surgeon: Dr. Aneesh Michael  Surgery Date: 8/06/2021  Time of Surgery: 7:00 AM  Where patient plans to recover: At home with family  Fax number for surgical facility: Note does not need to be faxed, will be available electronically in Epic.    Type of Anesthesia Anticipated: to be determined    Assessment & Plan     The proposed surgical procedure is considered LOW risk.    Preoperative examination  No contraindications for planned procedure    Kidney stone  He saw his PCP this past June for some left lower quadrant abdominal pain.  He had a CT scan which revealed a pair of 8, 9 mm left renal pelvic stones.  There is a central cyst or localized hydronephrosis with some calcific debris.  He saw the kidney stone [place], Dr. Michael.  He would like to proceed with stone clearance.    Essential hypertension  Blood pressure controlled on low-dose lisinopril.  He takes a 2.5 mg tablet of the lisinopril daily.  He is going to skip this on the morning of his procedure. He had a CMP checked on 6/22, potassium 4.5, creatinine 0.83    Hypercholesterolemia  Right bundle branch block noted on January 2021 EKG.  Stress test in 2001 normal.  Heart echo in August 2001 normal.  March 2021 cardiac CT scan calcium score 52, on simvastatin.  No chest pain or shortness of breath.  No anginal symptoms.  On simvastatin.  Not on baby aspirin.      Risks and Recommendations:  The patient has the following additional risks and recommendations for  perioperative complications:   - No identified additional risk factors other than previously addressed    Medication Instructions:  As above    RECOMMENDATION:  APPROVAL GIVEN to proceed with proposed procedure, without further diagnostic evaluation.    21 minutes spent on the date of the encounter doing chart review, history and exam, documentation and further activities per the note      Subjective     HPI related to upcoming procedure: He saw his PCP this past June for some left lower quadrant abdominal pain.  He had a CT scan which revealed a pair of 8, 9 mm left renal pelvic stones.  There is a central cyst or localized hydronephrosis with some calcific debris.  He saw the kidney stone [place], Dr. Michael.  He would like to proceed with stone clearance.    Preop Questions 7/29/2021   1. Have you ever had a heart attack or stroke? No   2. Have you ever had surgery on your heart or blood vessels, such as a stent placement, a coronary artery bypass, or surgery on an artery in your head, neck, heart, or legs? No   3. Do you have chest pain with activity? No   4. Do you have a history of  heart failure? No   5. Do you currently have a cold, bronchitis or symptoms of other infection? No   6. Do you have a cough, shortness of breath, or wheezing? No   7. Do you or anyone in your family have previous history of blood clots? No   8. Do you or does anyone in your family have a serious bleeding problem such as prolonged bleeding following surgeries or cuts? No   9. Have you ever had problems with anemia or been told to take iron pills? YES     10. Have you had any abnormal blood loss such as black, tarry or bloody stools? No   11. Have you ever had a blood transfusion? No   12. Are you willing to have a blood transfusion if it is medically needed before, during, or after your surgery? Yes   13. Have you or any of your relatives ever had problems with anesthesia? No   14. Do you have sleep apnea, excessive snoring or  daytime drowsiness? No   15. Do you have any artifical heart valves or other implanted medical devices like a pacemaker, defibrillator, or continuous glucose monitor? No   16. Do you have artificial joints? No   17. Are you allergic to latex? No       Health Care Directive:  Patient does not have a Health Care Directive or Living Will: Discussed advance care planning with patient; however, patient declined at this time.    Preoperative Review of :   reviewed - no record of controlled substances prescribed.      Status of Chronic Conditions:  See problem list for active medical problems.  Problems all longstanding and stable, except as noted/documented.  See ROS for pertinent symptoms related to these conditions.      Review of Systems  CONSTITUTIONAL: NEGATIVE for fever, chills, change in weight  INTEGUMENTARY/SKIN: NEGATIVE for worrisome rashes, moles or lesions  EYES: NEGATIVE for vision changes or irritation  ENT/MOUTH: NEGATIVE for ear, mouth and throat problems  RESP: NEGATIVE for significant cough or SOB  CV: NEGATIVE for chest pain, palpitations or peripheral edema  GI: NEGATIVE for nausea, abdominal pain, heartburn, or change in bowel habits  : NEGATIVE for frequency, dysuria, or hematuria  MUSCULOSKELETAL: NEGATIVE for significant arthralgias or myalgia  NEURO: NEGATIVE for weakness, dizziness or paresthesias  ENDOCRINE: NEGATIVE for temperature intolerance, skin/hair changes  HEME: NEGATIVE for bleeding problems  PSYCHIATRIC: NEGATIVE for changes in mood or affect    Patient Active Problem List    Diagnosis Date Noted     Calculus of ureter 07/28/2021     Priority: Medium     Added automatically from request for surgery [id number]       Epididymitis      Priority: Medium     Created by Conversion  Replacement Utility updated for latest IMO load         Essential Hypertension      Priority: Medium     Created by Conversion  [facility] Annotation: Jan 16 2009  1:39PM - Sheldon Harvey: started    lisinopril   12/08  Replacement Utility updated for latest IMO load         Fatigue 11/10/2014     Priority: Medium     Taking Medication For  A Long Time 11/10/2014     Priority: Medium     Vaccines Prophylactic Needed Against Influenza 11/10/2014     Priority: Medium     Hypercholesterolemia      Priority: Medium     Created by Conversion          Past Medical History:   Diagnosis Date     Hypertension      Past Surgical History:   Procedure Laterality Date     UNDESCENDED TESTICLE EXPLORATION Left 1968     Current Outpatient Medications   Medication Sig Dispense Refill     B complex 11-folic-C-biot-zinc [phone] mg-mg-mcg-mg Tab [B COMPLEX 11-FOLIC-C-BIOT-ZINC [phone] MG-MG-MCG-MG TAB] Take 1 tablet by mouth every other day.        co-enzyme Q-10 30 mg capsule [CO-ENZYME Q-10 30 MG CAPSULE] Take 30 mg by mouth 2 (two) times a day.        lisinopriL (PRINIVIL,ZESTRIL) 2.5 MG tablet [LISINOPRIL (PRINIVIL,ZESTRIL) 2.5 MG TABLET] Take 1 tablet (2.5 mg total) by mouth daily. 90 tablet 2     loratadine (CLARITIN) 10 mg tablet [LORATADINE (CLARITIN) 10 MG TABLET] Take 10 mg by mouth daily.       MEDICATION CANNOT BE REORDERED - PLEASE MANUALLY REORDER AND DISCONTINUE THE OLD ORDER [PHYTOSTEROL COMBINATION NO.1 500 MG CAP] Take by mouth. Take 2 tablets by mouth       MULTIVITAMIN ORAL [MULTIVITAMIN ORAL] Take 1 tablet by mouth daily.              OMEGA-3/DHA/EPA/FISH OIL (FISH OIL-OMEGA-3 FATTY ACIDS) 300-1,000 mg capsule [OMEGA-3/DHA/EPA/FISH OIL (FISH OIL-OMEGA-3 FATTY ACIDS) 300-1,000 MG CAPSULE] Take 2 g by mouth daily.       simvastatin (ZOCOR) 20 MG tablet [SIMVASTATIN (ZOCOR) 20 MG TABLET] Take 1 tablet (20 mg total) by mouth at bedtime. 90 tablet 3     ferrous sulfate 325 (65 FE) MG tablet [FERROUS SULFATE 325 (65 FE) MG TABLET] Take 1 tablet (325 mg total) by mouth daily with breakfast. take for 2 months, then recheck hemoglobin and iron labs. 60 tablet 0     omeprazole (PRILOSEC) 40 MG capsule  "[OMEPRAZOLE (PRILOSEC) 40 MG CAPSULE] Take 1 capsule by mouth once a day as directed Take 30min before the biggest meal of the day  2       No Known Allergies     Social History     Tobacco Use     Smoking status: Never Smoker     Smokeless tobacco: Never Used   Substance Use Topics     Alcohol use: Yes     Alcohol/week: 2.0 standard drinks     Family History   Problem Relation Age of Onset     Heart Disease Father      Snoring Father      Diabetes Father      Breast Cancer Mother      Other - See Comments Mother         Cancer of the jaw     Macular Degeneration Mother      Diabetes Type 2  Brother      History   Drug Use Not on file         Objective     /88 (BP Location: Right arm, Patient Position: Sitting, Cuff Size: Adult Regular)   Pulse 68   Ht 1.695 m (5' 6.75\")   Wt 81.1 kg (178 lb 14.4 oz)   BMI 28.23 kg/m      Physical Exam    GENERAL APPEARANCE: healthy, alert and no distress     EYES: EOMI,  PERRL     HENT: ear canals and TM's normal and nose and mouth without ulcers or lesions     NECK: no adenopathy, no asymmetry, masses, or scars and thyroid normal to palpation     RESP: lungs clear to auscultation - no rales, rhonchi or wheezes     CV: regular rates and rhythm, normal S1 S2, no S3 or S4 and no murmur, click or rub     ABDOMEN:  soft, nontender, no HSM or masses and bowel sounds normal     MS: extremities normal- no gross deformities noted, no evidence of inflammation in joints, FROM in all extremities.     SKIN: no suspicious lesions or rashes     NEURO: Normal strength and tone, sensory exam grossly normal, mentation intact and speech normal     PSYCH: mentation appears normal. and affect normal/bright     LYMPHATICS: No cervical adenopathy    Recent Labs   Lab Test 06/22/21  1202 01/21/21  1603   HGB 15.7 16.6    186    141   POTASSIUM 4.5 4.2   CR 0.83 0.84        Diagnostics:  As above, see ehr    Revised Cardiac Risk Index (RCRI):  The patient has the following serious " cardiovascular risks for perioperative complications:   - No serious cardiac risks = 0 points     RCRI Interpretation: 0 points: Class I (very low risk - 0.4% complication rate)           Signed Electronically by: Austin Patricio CNP  Copy of this evaluation report is provided to requesting physician.

## 2021-08-05 ENCOUNTER — ANESTHESIA EVENT (OUTPATIENT)
Dept: SURGERY | Facility: AMBULATORY SURGERY CENTER | Age: 66
End: 2021-08-05
Payer: MEDICARE

## 2021-08-06 ENCOUNTER — HOSPITAL ENCOUNTER (OUTPATIENT)
Facility: AMBULATORY SURGERY CENTER | Age: 66
End: 2021-08-06
Attending: UROLOGY
Payer: MEDICARE

## 2021-08-06 ENCOUNTER — ANESTHESIA (OUTPATIENT)
Dept: SURGERY | Facility: AMBULATORY SURGERY CENTER | Age: 66
End: 2021-08-06
Payer: MEDICARE

## 2021-08-06 VITALS
DIASTOLIC BLOOD PRESSURE: 75 MMHG | RESPIRATION RATE: 16 BRPM | HEART RATE: 53 BPM | OXYGEN SATURATION: 97 % | TEMPERATURE: 96.8 F | SYSTOLIC BLOOD PRESSURE: 127 MMHG

## 2021-08-06 DIAGNOSIS — N20.0 CALCULUS OF KIDNEY: Primary | ICD-10-CM

## 2021-08-06 DIAGNOSIS — N20.1 CALCULUS OF URETER: ICD-10-CM

## 2021-08-06 PROCEDURE — 82365 CALCULUS SPECTROSCOPY: CPT | Performed by: UROLOGY

## 2021-08-06 PROCEDURE — 52356 CYSTO/URETERO W/LITHOTRIPSY: CPT | Mod: 22 | Performed by: UROLOGY

## 2021-08-06 DEVICE — URETERAL STENT
Type: IMPLANTABLE DEVICE | Site: URETER | Status: NON-FUNCTIONAL
Brand: PERCUFLEX™ PLUS
Removed: 2021-08-24

## 2021-08-06 RX ORDER — SODIUM CHLORIDE, SODIUM LACTATE, POTASSIUM CHLORIDE, CALCIUM CHLORIDE 600; 310; 30; 20 MG/100ML; MG/100ML; MG/100ML; MG/100ML
INJECTION, SOLUTION INTRAVENOUS CONTINUOUS
Status: DISCONTINUED | OUTPATIENT
Start: 2021-08-06 | End: 2021-08-07 | Stop reason: HOSPADM

## 2021-08-06 RX ORDER — MAGNESIUM SULFATE HEPTAHYDRATE 40 MG/ML
4 INJECTION, SOLUTION INTRAVENOUS ONCE
Status: COMPLETED | OUTPATIENT
Start: 2021-08-06 | End: 2021-08-06

## 2021-08-06 RX ORDER — ONDANSETRON 4 MG/1
4 TABLET, ORALLY DISINTEGRATING ORAL EVERY 30 MIN PRN
Status: DISCONTINUED | OUTPATIENT
Start: 2021-08-06 | End: 2021-08-07 | Stop reason: HOSPADM

## 2021-08-06 RX ORDER — LIDOCAINE 40 MG/G
CREAM TOPICAL
Status: DISCONTINUED | OUTPATIENT
Start: 2021-08-06 | End: 2021-08-07 | Stop reason: HOSPADM

## 2021-08-06 RX ORDER — OXYCODONE HYDROCHLORIDE 5 MG/1
5 TABLET ORAL EVERY 6 HOURS PRN
Qty: 20 TABLET | Refills: 0 | Status: SHIPPED | OUTPATIENT
Start: 2021-08-06 | End: 2021-08-09

## 2021-08-06 RX ORDER — GABAPENTIN 300 MG/1
300 CAPSULE ORAL
Status: COMPLETED | OUTPATIENT
Start: 2021-08-06 | End: 2021-08-06

## 2021-08-06 RX ORDER — ACETAMINOPHEN 325 MG/1
975 TABLET ORAL EVERY 4 HOURS PRN
Status: DISCONTINUED | OUTPATIENT
Start: 2021-08-06 | End: 2021-08-07 | Stop reason: HOSPADM

## 2021-08-06 RX ORDER — OXYCODONE HYDROCHLORIDE 5 MG/1
5 TABLET ORAL EVERY 4 HOURS PRN
Status: DISCONTINUED | OUTPATIENT
Start: 2021-08-06 | End: 2021-08-07 | Stop reason: HOSPADM

## 2021-08-06 RX ORDER — KETAMINE HYDROCHLORIDE 10 MG/ML
INJECTION INTRAMUSCULAR; INTRAVENOUS PRN
Status: DISCONTINUED | OUTPATIENT
Start: 2021-08-06 | End: 2021-08-06

## 2021-08-06 RX ORDER — DEXAMETHASONE SODIUM PHOSPHATE 10 MG/ML
INJECTION, SOLUTION INTRAMUSCULAR; INTRAVENOUS PRN
Status: DISCONTINUED | OUTPATIENT
Start: 2021-08-06 | End: 2021-08-06

## 2021-08-06 RX ORDER — FENTANYL CITRATE 50 UG/ML
INJECTION, SOLUTION INTRAMUSCULAR; INTRAVENOUS PRN
Status: DISCONTINUED | OUTPATIENT
Start: 2021-08-06 | End: 2021-08-06

## 2021-08-06 RX ORDER — HYDROMORPHONE HCL IN WATER/PF 6 MG/30 ML
0.2 PATIENT CONTROLLED ANALGESIA SYRINGE INTRAVENOUS EVERY 5 MIN PRN
Status: DISCONTINUED | OUTPATIENT
Start: 2021-08-06 | End: 2021-08-07 | Stop reason: HOSPADM

## 2021-08-06 RX ORDER — GLYCOPYRROLATE 0.2 MG/ML
INJECTION, SOLUTION INTRAMUSCULAR; INTRAVENOUS PRN
Status: DISCONTINUED | OUTPATIENT
Start: 2021-08-06 | End: 2021-08-06

## 2021-08-06 RX ORDER — ONDANSETRON 2 MG/ML
INJECTION INTRAMUSCULAR; INTRAVENOUS PRN
Status: DISCONTINUED | OUTPATIENT
Start: 2021-08-06 | End: 2021-08-06

## 2021-08-06 RX ORDER — CEFAZOLIN SODIUM 2 G/100ML
2 INJECTION, SOLUTION INTRAVENOUS
Status: COMPLETED | OUTPATIENT
Start: 2021-08-06 | End: 2021-08-06

## 2021-08-06 RX ORDER — NEOSTIGMINE METHYLSULFATE 1 MG/ML
VIAL (ML) INJECTION PRN
Status: DISCONTINUED | OUTPATIENT
Start: 2021-08-06 | End: 2021-08-06

## 2021-08-06 RX ORDER — PROPOFOL 10 MG/ML
INJECTION, EMULSION INTRAVENOUS CONTINUOUS PRN
Status: DISCONTINUED | OUTPATIENT
Start: 2021-08-06 | End: 2021-08-06

## 2021-08-06 RX ORDER — KETOROLAC TROMETHAMINE 15 MG/ML
15 INJECTION, SOLUTION INTRAMUSCULAR; INTRAVENOUS
Status: DISCONTINUED | OUTPATIENT
Start: 2021-08-06 | End: 2021-08-07 | Stop reason: HOSPADM

## 2021-08-06 RX ORDER — LIDOCAINE HYDROCHLORIDE 20 MG/ML
INJECTION, SOLUTION INFILTRATION; PERINEURAL PRN
Status: DISCONTINUED | OUTPATIENT
Start: 2021-08-06 | End: 2021-08-06

## 2021-08-06 RX ORDER — LABETALOL 20 MG/4 ML (5 MG/ML) INTRAVENOUS SYRINGE
5
Status: SHIPPED | OUTPATIENT
Start: 2021-08-06 | End: 2021-08-06

## 2021-08-06 RX ORDER — PROPOFOL 10 MG/ML
INJECTION, EMULSION INTRAVENOUS PRN
Status: DISCONTINUED | OUTPATIENT
Start: 2021-08-06 | End: 2021-08-06

## 2021-08-06 RX ORDER — ONDANSETRON 2 MG/ML
4 INJECTION INTRAMUSCULAR; INTRAVENOUS EVERY 30 MIN PRN
Status: DISCONTINUED | OUTPATIENT
Start: 2021-08-06 | End: 2021-08-07 | Stop reason: HOSPADM

## 2021-08-06 RX ORDER — ESMOLOL HYDROCHLORIDE 10 MG/ML
INJECTION INTRAVENOUS PRN
Status: DISCONTINUED | OUTPATIENT
Start: 2021-08-06 | End: 2021-08-06

## 2021-08-06 RX ORDER — FENTANYL CITRATE 50 UG/ML
25-50 INJECTION, SOLUTION INTRAMUSCULAR; INTRAVENOUS EVERY 5 MIN PRN
Status: DISCONTINUED | OUTPATIENT
Start: 2021-08-06 | End: 2021-08-07 | Stop reason: HOSPADM

## 2021-08-06 RX ORDER — FENTANYL CITRATE 50 UG/ML
25 INJECTION, SOLUTION INTRAMUSCULAR; INTRAVENOUS EVERY 5 MIN PRN
Status: SHIPPED | OUTPATIENT
Start: 2021-08-06 | End: 2021-08-06

## 2021-08-06 RX ADMIN — MAGNESIUM SULFATE HEPTAHYDRATE 4 G: 40 INJECTION, SOLUTION INTRAVENOUS at 07:49

## 2021-08-06 RX ADMIN — GLYCOPYRROLATE 0.2 MG: 0.2 INJECTION, SOLUTION INTRAMUSCULAR; INTRAVENOUS at 07:59

## 2021-08-06 RX ADMIN — KETOROLAC TROMETHAMINE 15 MG: 15 INJECTION, SOLUTION INTRAMUSCULAR; INTRAVENOUS at 07:51

## 2021-08-06 RX ADMIN — LIDOCAINE HYDROCHLORIDE 60 MG: 20 INJECTION, SOLUTION INFILTRATION; PERINEURAL at 07:59

## 2021-08-06 RX ADMIN — FENTANYL CITRATE 50 MCG: 50 INJECTION, SOLUTION INTRAMUSCULAR; INTRAVENOUS at 07:50

## 2021-08-06 RX ADMIN — Medication 3 MG: at 08:53

## 2021-08-06 RX ADMIN — ESMOLOL HYDROCHLORIDE 30 MG: 10 INJECTION INTRAVENOUS at 08:54

## 2021-08-06 RX ADMIN — Medication 30 MG: at 08:01

## 2021-08-06 RX ADMIN — SODIUM CHLORIDE, SODIUM LACTATE, POTASSIUM CHLORIDE, CALCIUM CHLORIDE: 600; 310; 30; 20 INJECTION, SOLUTION INTRAVENOUS at 08:21

## 2021-08-06 RX ADMIN — FENTANYL CITRATE 50 MCG: 50 INJECTION, SOLUTION INTRAMUSCULAR; INTRAVENOUS at 08:24

## 2021-08-06 RX ADMIN — GLYCOPYRROLATE 0.6 MG: 0.2 INJECTION, SOLUTION INTRAMUSCULAR; INTRAVENOUS at 08:53

## 2021-08-06 RX ADMIN — DEXAMETHASONE SODIUM PHOSPHATE 10 MG: 10 INJECTION, SOLUTION INTRAMUSCULAR; INTRAVENOUS at 08:01

## 2021-08-06 RX ADMIN — PROPOFOL 150 MG: 10 INJECTION, EMULSION INTRAVENOUS at 08:01

## 2021-08-06 RX ADMIN — ONDANSETRON 4 MG: 2 INJECTION INTRAMUSCULAR; INTRAVENOUS at 07:59

## 2021-08-06 RX ADMIN — GABAPENTIN 300 MG: 300 CAPSULE ORAL at 07:40

## 2021-08-06 RX ADMIN — SODIUM CHLORIDE, SODIUM LACTATE, POTASSIUM CHLORIDE, CALCIUM CHLORIDE: 600; 310; 30; 20 INJECTION, SOLUTION INTRAVENOUS at 07:49

## 2021-08-06 RX ADMIN — KETAMINE HYDROCHLORIDE 30 MG: 10 INJECTION INTRAMUSCULAR; INTRAVENOUS at 08:01

## 2021-08-06 RX ADMIN — CEFAZOLIN SODIUM 2 G: 2 INJECTION, SOLUTION INTRAVENOUS at 08:16

## 2021-08-06 RX ADMIN — ESMOLOL HYDROCHLORIDE 70 MG: 10 INJECTION INTRAVENOUS at 07:59

## 2021-08-06 RX ADMIN — PROPOFOL 100 MCG/KG/MIN: 10 INJECTION, EMULSION INTRAVENOUS at 08:15

## 2021-08-06 NOTE — ANESTHESIA PREPROCEDURE EVALUATION
Anesthesia Pre-Procedure Evaluation    Patient: Roberto Belle   MRN: [id number] : 1955        Preoperative Diagnosis: Calculus of ureter [N20.1]   Procedure : Procedure(s):  CYSTOURETEROSCOPY, WITH RETROGRADE PYELOGRAM, LASER LITHOTRIPSY, CALCULUS REMOVAL AND URETERAL STENT INSERTION     Past Medical History:   Diagnosis Date     Arthritis      Hypertension       Past Surgical History:   Procedure Laterality Date     UNDESCENDED TESTICLE EXPLORATION Left       No Known Allergies   Social History     Tobacco Use     Smoking status: Never Smoker     Smokeless tobacco: Never Used   Substance Use Topics     Alcohol use: Yes     Alcohol/week: 2.0 standard drinks     Comment: 1-2/week      Wt Readings from Last 1 Encounters:   21 81.1 kg (178 lb 14.4 oz)        Anesthesia Evaluation            ROS/MED HX  ENT/Pulmonary:  - neg pulmonary ROS     Neurologic:  - neg neurologic ROS     Cardiovascular:     (+) Dyslipidemia hypertension-----    METS/Exercise Tolerance: >4 METS    Hematologic:  - neg hematologic  ROS     Musculoskeletal:   (+) arthritis,     GI/Hepatic:  - neg GI/hepatic ROS     Renal/Genitourinary:  - neg Renal ROS     Endo:  - neg endo ROS     Psychiatric/Substance Use:  - neg psychiatric ROS     Infectious Disease:  - neg infectious disease ROS     Malignancy:       Other:  - neg other ROS          Physical Exam    Airway        Mallampati: II   TM distance: > 3 FB   Neck ROM: full     Respiratory Devices and Support         Dental       (+) chipped      Cardiovascular   cardiovascular exam normal          Pulmonary   pulmonary exam normal                OUTSIDE LABS:  CBC:   Lab Results   Component Value Date    WBC 6.3 2021    WBC 7.0 2021    HGB 15.7 2021    HGB 16.6 2021    HCT 45.7 2021    HCT 49.8 2021     2021     2021     BMP:   Lab Results   Component Value Date     2021     2021    POTASSIUM 4.5  06/22/2021    POTASSIUM 4.2 01/21/2021    CHLORIDE 104 06/22/2021    CHLORIDE 104 01/21/2021    CO2 25 06/22/2021    CO2 27 01/21/2021    BUN 23 (H) 06/22/2021    BUN 16 01/21/2021    CR 0.83 06/22/2021    CR 0.84 01/21/2021    GLC 89 06/22/2021    GLC 87 01/21/2021     COAGS: No results found for: PTT, INR, FIBR  POC: No results found for: BGM, HCG, HCGS  HEPATIC:   Lab Results   Component Value Date    ALBUMIN 4.4 06/22/2021    PROTTOTAL 7.2 06/22/2021    ALT 17 06/22/2021    AST 30 06/22/2021    ALKPHOS 88 06/22/2021    BILITOTAL 0.6 06/22/2021     OTHER:   Lab Results   Component Value Date    MICHELLE 10.2 06/22/2021       Anesthesia Plan    ASA Status:  2      Anesthesia Type: General.     - Airway: ETT              Consents    Anesthesia Plan(s) and associated risks, benefits, and realistic alternatives discussed. Questions answered and patient/representative(s) expressed understanding.     - Discussed with:  Patient      - Patient is DNR/DNI Status: No         Postoperative Care    Pain management: IV analgesics, Oral pain medications, Multi-modal analgesia.   PONV prophylaxis: Ondansetron (or other 5HT-3), Dexamethasone or Solumedrol, Background Propofol Infusion     Comments:                Celi Latham MD

## 2021-08-06 NOTE — OP NOTE
[facility]  Kidney Stone [place] Operative Note    Roberto Belle   August 6, 2021 8/6/2021   Sheldon Harvey     Procedure Performed  Procedure(s):  CYSTOURETEROSCOPY, WITH RETROGRADE PYELOGRAM, LASER LITHOTRIPSY, CALCULUS REMOVAL AND URETERAL STENT INSERTION  1. Cystoscopy  2. Retrograde Pyelography - Left  3. Ureteroscopic Laser Lithotripsy - Left Kidney Mid Pole      Pre-operative Diagnosis  Calculus of ureter [N20.1]    Post-operative Diagnosis  1. Stone Left Kidney Mid Pole      Surgeon(s) and Role:     * Aneesh Michael MD - Primary    Anesthesia Type  Not documented     Procedural Summary  Increased procedural complexity because of complex renal / stone anatomy requiring 3 times expected time to perform procedure  Estimation of stone clearance: 2-4 mm residual  Subjective stone composition: calcium  Renal papillae assessment: plaques mild  Unanticipated event/findings: ureteral perforation - grade 1 (mucosal splitting) and large dumb bell shaped stone obstructing midpole major infundibulum  Post-operative plan: return to OR in 2 weeks for definitive stone clearance    Narrative    Challenging case of large dumb bell stone obstructing midpole infundibulum with consequent midpole hydrocalyx and innumerable small stones in region of stasis. Today the large stone was destrocyed with laser but distorted anatomy was a barrier to safe clearance of fragments and small stones. Will return in two weeks once the dust and debris settles to clear any significant fragments.    Procedural Details    Patient is brought to the surgical suite where Not documented anesthesia is induced. he is prepped and draped in standard fashion in combined Trendelenberg and lithotomy position.    Cystoscopy: Rigid cystoscopy is performed. Anterior and posterior urethra are normal. Prostate demonstrates moderate adenopathy. Bladder is normal..     Retrograde Pyelography:  imaging demonstrates radio-opaque renal stone  consistent with pre-operative imaging. Left retrograde pyelography demonstrates radio-opaque renal calculi consistent with pre-operative imaging.     Ureteral Access: Left ureteral access is initiated with Bentson wire. Guide wire access to the kidney is assured. 8F dilator is inserted with minimal resistance. 10F dilator is inserted with minimal resistance. 12F ureteral access sheath obturator is inserted with minimal resistance. 14F 36 cm ureteral access sheath is inserted with minimal resistance.      Flexible Ureteroscopy: Flexible ureteroscope is passed to kidney. Left mid pole stones are identified. Stones are severely impacted. In situ laser lithotripsy is performed with moderate generation of fine debris. Larger stones are translocated to dependent renal upper pole calyx where laser lithotripsy is performed with moderate generation of fine debris. After destroying the obstructing midpole infundibular stone,  innumerable spherical stones are encountered within the revealed hydrocalyx. Larger stones are destroyed. The ureter was showing signs of mucosal splitting and it was unwise to attempt extraction of this large stone burden at this time. Will return for completion of clearance.     Ureteral Stent Insertion: Left 7F 26 cm stent is inserted with good coil in kidney and bladder under fluoroscopic guidance.      The patient was then taken to the recovery room in good condition.     Past Medical History:   Diagnosis Date     Arthritis      Hypertension         Patient Active Problem List   Diagnosis     Epididymitis     Hypercholesterolemia     Essential Hypertension     Fatigue     Taking Medication For  A Long Time     Vaccines Prophylactic Needed Against Influenza     Calculus of ureter        Estimated Blood Loss  .* No values recorded between 8/6/2021  8:18 AM and 8/6/2021  9:05 AM *     ID Type Source Tests Collected by Time Destination   A :  Calculus/Stone Kidney, Left STONE ANALYSIS Aneesh Michael,  MD 8/6/2021  8:29 AM

## 2021-08-06 NOTE — ANESTHESIA POSTPROCEDURE EVALUATION
Patient: Roberto Belle    Procedure(s):  CYSTOURETEROSCOPY, WITH RETROGRADE PYELOGRAM, LASER LITHOTRIPSY, CALCULUS REMOVAL AND URETERAL STENT INSERTION    Diagnosis:Calculus of ureter [N20.1]  Diagnosis Additional Information: No value filed.    Anesthesia Type:  General    Note:  Disposition: Outpatient   Postop Pain Control: Uneventful            Sign Out: Well controlled pain   PONV: No   Neuro/Psych: Uneventful            Sign Out: Acceptable/Baseline neuro status   Airway/Respiratory: Uneventful            Sign Out: Acceptable/Baseline resp. status   CV/Hemodynamics: Uneventful            Sign Out: Acceptable CV status; No obvious hypovolemia; No obvious fluid overload   Other NRE: NONE   DID A NON-ROUTINE EVENT OCCUR? No           Last vitals:  Vitals Value Taken Time   /66 08/06/21 0915   Temp     Pulse 60 08/06/21 0915   Resp 16 08/06/21 0915   SpO2 93 % 08/06/21 0915       Electronically Signed By: WILBERT FLOWER MD  August 6, 2021  12:46 PM

## 2021-08-06 NOTE — ANESTHESIA PROCEDURE NOTES
Airway       Patient location during procedure: OR       Procedure Start/Stop Times: 8/6/2021 8:04 AM and 8/6/2021 8:07 AM  Staff -        Anesthesiologist:  Celi Latham MD       Performed By: anesthesiologistIndications and Patient Condition       Indications for airway management: isaac-procedural        Final Airway Details       Final airway type: endotracheal airway       Successful airway: ETT - single  Endotracheal Airway Details        ETT size (mm): 7.5       Cuffed: yes       Successful intubation technique: video laryngoscopy       Grade View of Cords: 1       Position: Right       Measured from: gums/teeth       Secured at (cm): 23       Bite block used: Molar    Post intubation assessment        Placement verified by: capnometry and equal breath sounds        Number of attempts at approach: 2 (One attempt with Mil 3 grade 4 view)       Secured with: silk tape       Ease of procedure: easy       Dentition: Intact

## 2021-08-06 NOTE — ANESTHESIA CARE TRANSFER NOTE
Patient: Roberto Belle    Procedure(s):  CYSTOURETEROSCOPY, WITH RETROGRADE PYELOGRAM, LASER LITHOTRIPSY, CALCULUS REMOVAL AND URETERAL STENT INSERTION    Diagnosis: Calculus of ureter [N20.1]  Diagnosis Additional Information: No value filed.    Anesthesia Type:   General     Note:    Oropharynx: oropharynx clear of all foreign objects  Level of Consciousness: drowsy  Oxygen Supplementation: face mask    Independent Airway: airway patency satisfactory and stable  Dentition: dentition unchanged  Vital Signs Stable: post-procedure vital signs reviewed and stable  Report to RN Given: handoff report given  Patient transferred to: PACU    Handoff Report: Identifed the Patient, Identified the Reponsible Provider, Reviewed the pertinent medical history, Discussed the surgical course, Reviewed Intra-OP anesthesia mangement and issues during anesthesia, Set expectations for post-procedure period and Allowed opportunity for questions and acknowledgement of understanding      Vitals:  Vitals Value Taken Time   BP     Temp     Pulse     Resp     SpO2         Vitals:    08/06/21 0732 08/06/21 0900   BP: 126/85 127/79   Cuff Size: Adult Regular    Pulse: 68 60   Resp: 16 16   Temp: 97.8  F (36.6  C) 96.9  F (36.1  C)   TempSrc: Temporal Temporal   SpO2: 97% 96%     Electronically Signed By: Celi Latham MD  August 6, 2021  9:13 AM

## 2021-08-06 NOTE — DISCHARGE INSTRUCTIONS
Going Home With a Ureteral Stent    What is it?  A stent is a soft, plastic tube that helps urine (pee) drain into the bladder.  During the surgery, it is placed in the ureter the tube that connects the kidney to the bladder.  A thin curl at each end of the stent keeps one end in your kidney and the other in your bladder.  The stent can not be seen from outside of the body.    Why Do I Have It?  Some sort of blockage is not letting pee drain into your bladder.  This could be from a stone, certain surgeries or kidney infection.    What Should I Expect?   Stents often cause some discomfort.  You may have:    The need to pee suddenly    Pain when you pee    A dull backache, which may get worse when you pee  Blood in your pee (color of fruit punch) and some clots, which may increase with physical activity.     What Can I Do To Feel Better?    Drink a little more fluids than usual.  You can eat your normal diet.    Enjoy a warm bath.  Decrease your activity.  Some people find bending or twisting movement cause discomfort or increased blood in the urine.  Even so, you will not harm yourself.    You received a medication called Toradol (a stronger IV ibuprofen) at 7:51 AM. Do NOT take any Ibuprofen / Advil / Aleve / Naproxen or products containing Ibuprofen until 1:51 PM or later.    If you have any questions or concerns regarding your procedure, please contact Dr. Michael, their office number is [phone].

## 2021-08-09 ENCOUNTER — PREP FOR PROCEDURE (OUTPATIENT)
Dept: UROLOGY | Facility: CLINIC | Age: 66
End: 2021-08-09

## 2021-08-09 DIAGNOSIS — N20.0 CALCULUS OF KIDNEY: Primary | ICD-10-CM

## 2021-08-09 RX ORDER — GABAPENTIN 100 MG/1
300 CAPSULE ORAL
Status: CANCELLED | OUTPATIENT
Start: 2021-08-09

## 2021-08-09 RX ORDER — KETOROLAC TROMETHAMINE 30 MG/ML
15 INJECTION, SOLUTION INTRAMUSCULAR; INTRAVENOUS
Status: CANCELLED | OUTPATIENT
Start: 2021-08-09

## 2021-08-09 RX ORDER — ACETAMINOPHEN 325 MG/1
975 TABLET ORAL ONCE
Status: CANCELLED | OUTPATIENT
Start: 2021-08-09 | End: 2021-08-09

## 2021-08-10 LAB
APPEARANCE STONE: NORMAL
COMPN STONE: NORMAL
NUMBER STONE: 1
SIZE STONE: NORMAL MM
SPECIMEN WT: 23 MG

## 2021-08-20 ENCOUNTER — LAB (OUTPATIENT)
Dept: LAB | Facility: CLINIC | Age: 66
End: 2021-08-20
Attending: UROLOGY
Payer: MEDICARE

## 2021-08-20 DIAGNOSIS — N20.0 CALCULUS OF KIDNEY: ICD-10-CM

## 2021-08-20 PROCEDURE — U0005 INFEC AGEN DETEC AMPLI PROBE: HCPCS

## 2021-08-20 PROCEDURE — U0003 INFECTIOUS AGENT DETECTION BY NUCLEIC ACID (DNA OR RNA); SEVERE ACUTE RESPIRATORY SYNDROME CORONAVIRUS 2 (SARS-COV-2) (CORONAVIRUS DISEASE [COVID-19]), AMPLIFIED PROBE TECHNIQUE, MAKING USE OF HIGH THROUGHPUT TECHNOLOGIES AS DESCRIBED BY CMS-2020-01-R: HCPCS

## 2021-08-21 LAB — SARS-COV-2 RNA RESP QL NAA+PROBE: NEGATIVE

## 2021-08-23 ENCOUNTER — ANESTHESIA EVENT (OUTPATIENT)
Dept: SURGERY | Facility: AMBULATORY SURGERY CENTER | Age: 66
End: 2021-08-23
Payer: MEDICARE

## 2021-08-24 ENCOUNTER — ANESTHESIA (OUTPATIENT)
Dept: SURGERY | Facility: AMBULATORY SURGERY CENTER | Age: 66
End: 2021-08-24
Payer: MEDICARE

## 2021-08-24 ENCOUNTER — HOSPITAL ENCOUNTER (OUTPATIENT)
Facility: AMBULATORY SURGERY CENTER | Age: 66
End: 2021-08-24
Attending: UROLOGY
Payer: MEDICARE

## 2021-08-24 VITALS
TEMPERATURE: 96.8 F | HEART RATE: 57 BPM | RESPIRATION RATE: 16 BRPM | DIASTOLIC BLOOD PRESSURE: 74 MMHG | SYSTOLIC BLOOD PRESSURE: 127 MMHG | OXYGEN SATURATION: 97 %

## 2021-08-24 DIAGNOSIS — N20.0 CALCULUS OF KIDNEY: ICD-10-CM

## 2021-08-24 PROCEDURE — 82365 CALCULUS SPECTROSCOPY: CPT | Performed by: UROLOGY

## 2021-08-24 PROCEDURE — 52356 CYSTO/URETERO W/LITHOTRIPSY: CPT | Mod: LT | Performed by: UROLOGY

## 2021-08-24 DEVICE — URETERAL STENT
Type: IMPLANTABLE DEVICE | Site: URETER | Status: FUNCTIONAL
Brand: PERCUFLEX™ PLUS

## 2021-08-24 RX ORDER — GLYCOPYRROLATE 0.2 MG/ML
INJECTION, SOLUTION INTRAMUSCULAR; INTRAVENOUS PRN
Status: DISCONTINUED | OUTPATIENT
Start: 2021-08-24 | End: 2021-08-24

## 2021-08-24 RX ORDER — KETOROLAC TROMETHAMINE 15 MG/ML
15 INJECTION, SOLUTION INTRAMUSCULAR; INTRAVENOUS
Status: DISCONTINUED | OUTPATIENT
Start: 2021-08-24 | End: 2021-08-25 | Stop reason: HOSPADM

## 2021-08-24 RX ORDER — ACETAMINOPHEN 325 MG/1
975 TABLET ORAL ONCE
Status: COMPLETED | OUTPATIENT
Start: 2021-08-24 | End: 2021-08-24

## 2021-08-24 RX ORDER — KETAMINE HYDROCHLORIDE 10 MG/ML
INJECTION INTRAMUSCULAR; INTRAVENOUS PRN
Status: DISCONTINUED | OUTPATIENT
Start: 2021-08-24 | End: 2021-08-24

## 2021-08-24 RX ORDER — FENTANYL CITRATE 50 UG/ML
INJECTION, SOLUTION INTRAMUSCULAR; INTRAVENOUS PRN
Status: DISCONTINUED | OUTPATIENT
Start: 2021-08-24 | End: 2021-08-24

## 2021-08-24 RX ORDER — MAGNESIUM SULFATE HEPTAHYDRATE 40 MG/ML
4 INJECTION, SOLUTION INTRAVENOUS ONCE
Status: COMPLETED | OUTPATIENT
Start: 2021-08-24 | End: 2021-08-24

## 2021-08-24 RX ORDER — CEFAZOLIN SODIUM 2 G/100ML
2 INJECTION, SOLUTION INTRAVENOUS
Status: COMPLETED | OUTPATIENT
Start: 2021-08-24 | End: 2021-08-24

## 2021-08-24 RX ORDER — PROPOFOL 10 MG/ML
INJECTION, EMULSION INTRAVENOUS CONTINUOUS PRN
Status: DISCONTINUED | OUTPATIENT
Start: 2021-08-24 | End: 2021-08-24

## 2021-08-24 RX ORDER — LIDOCAINE HYDROCHLORIDE 20 MG/ML
INJECTION, SOLUTION INFILTRATION; PERINEURAL PRN
Status: DISCONTINUED | OUTPATIENT
Start: 2021-08-24 | End: 2021-08-24

## 2021-08-24 RX ORDER — SODIUM CHLORIDE, SODIUM LACTATE, POTASSIUM CHLORIDE, CALCIUM CHLORIDE 600; 310; 30; 20 MG/100ML; MG/100ML; MG/100ML; MG/100ML
INJECTION, SOLUTION INTRAVENOUS CONTINUOUS
Status: DISCONTINUED | OUTPATIENT
Start: 2021-08-24 | End: 2021-08-25 | Stop reason: HOSPADM

## 2021-08-24 RX ORDER — FENTANYL CITRATE 50 UG/ML
50 INJECTION, SOLUTION INTRAMUSCULAR; INTRAVENOUS EVERY 5 MIN PRN
Status: DISCONTINUED | OUTPATIENT
Start: 2021-08-24 | End: 2021-08-25 | Stop reason: HOSPADM

## 2021-08-24 RX ORDER — ACETAMINOPHEN 325 MG/1
975 TABLET ORAL ONCE
Status: DISCONTINUED | OUTPATIENT
Start: 2021-08-24 | End: 2021-08-25 | Stop reason: HOSPADM

## 2021-08-24 RX ORDER — ONDANSETRON 2 MG/ML
4 INJECTION INTRAMUSCULAR; INTRAVENOUS EVERY 30 MIN PRN
Status: DISCONTINUED | OUTPATIENT
Start: 2021-08-24 | End: 2021-08-25 | Stop reason: HOSPADM

## 2021-08-24 RX ORDER — ONDANSETRON 4 MG/1
4 TABLET, ORALLY DISINTEGRATING ORAL EVERY 30 MIN PRN
Status: DISCONTINUED | OUTPATIENT
Start: 2021-08-24 | End: 2021-08-25 | Stop reason: HOSPADM

## 2021-08-24 RX ORDER — OXYCODONE HYDROCHLORIDE 5 MG/1
5 TABLET ORAL EVERY 4 HOURS PRN
Status: DISCONTINUED | OUTPATIENT
Start: 2021-08-24 | End: 2021-08-25 | Stop reason: HOSPADM

## 2021-08-24 RX ORDER — PROPOFOL 10 MG/ML
INJECTION, EMULSION INTRAVENOUS PRN
Status: DISCONTINUED | OUTPATIENT
Start: 2021-08-24 | End: 2021-08-24

## 2021-08-24 RX ORDER — ONDANSETRON 2 MG/ML
INJECTION INTRAMUSCULAR; INTRAVENOUS PRN
Status: DISCONTINUED | OUTPATIENT
Start: 2021-08-24 | End: 2021-08-24

## 2021-08-24 RX ORDER — MEPERIDINE HYDROCHLORIDE 25 MG/ML
12.5 INJECTION INTRAMUSCULAR; INTRAVENOUS; SUBCUTANEOUS
Status: DISCONTINUED | OUTPATIENT
Start: 2021-08-24 | End: 2021-08-25 | Stop reason: HOSPADM

## 2021-08-24 RX ORDER — FENTANYL CITRATE 50 UG/ML
50 INJECTION, SOLUTION INTRAMUSCULAR; INTRAVENOUS
Status: DISCONTINUED | OUTPATIENT
Start: 2021-08-24 | End: 2021-08-25 | Stop reason: HOSPADM

## 2021-08-24 RX ORDER — GABAPENTIN 300 MG/1
300 CAPSULE ORAL
Status: COMPLETED | OUTPATIENT
Start: 2021-08-24 | End: 2021-08-24

## 2021-08-24 RX ORDER — DEXAMETHASONE SODIUM PHOSPHATE 4 MG/ML
INJECTION, SOLUTION INTRA-ARTICULAR; INTRALESIONAL; INTRAMUSCULAR; INTRAVENOUS; SOFT TISSUE PRN
Status: DISCONTINUED | OUTPATIENT
Start: 2021-08-24 | End: 2021-08-24

## 2021-08-24 RX ORDER — LIDOCAINE 40 MG/G
CREAM TOPICAL
Status: DISCONTINUED | OUTPATIENT
Start: 2021-08-24 | End: 2021-08-25 | Stop reason: HOSPADM

## 2021-08-24 RX ADMIN — MAGNESIUM SULFATE HEPTAHYDRATE 4 G: 40 INJECTION, SOLUTION INTRAVENOUS at 09:21

## 2021-08-24 RX ADMIN — SODIUM CHLORIDE, SODIUM LACTATE, POTASSIUM CHLORIDE, CALCIUM CHLORIDE: 600; 310; 30; 20 INJECTION, SOLUTION INTRAVENOUS at 10:05

## 2021-08-24 RX ADMIN — KETOROLAC TROMETHAMINE 15 MG: 15 INJECTION, SOLUTION INTRAMUSCULAR; INTRAVENOUS at 09:20

## 2021-08-24 RX ADMIN — GABAPENTIN 300 MG: 300 CAPSULE ORAL at 09:11

## 2021-08-24 RX ADMIN — DEXAMETHASONE SODIUM PHOSPHATE 10 MG: 4 INJECTION, SOLUTION INTRA-ARTICULAR; INTRALESIONAL; INTRAMUSCULAR; INTRAVENOUS; SOFT TISSUE at 09:39

## 2021-08-24 RX ADMIN — SODIUM CHLORIDE, SODIUM LACTATE, POTASSIUM CHLORIDE, CALCIUM CHLORIDE: 600; 310; 30; 20 INJECTION, SOLUTION INTRAVENOUS at 09:16

## 2021-08-24 RX ADMIN — PROPOFOL 130 MG: 10 INJECTION, EMULSION INTRAVENOUS at 09:38

## 2021-08-24 RX ADMIN — PROPOFOL 200 MCG/KG/MIN: 10 INJECTION, EMULSION INTRAVENOUS at 09:38

## 2021-08-24 RX ADMIN — ONDANSETRON 4 MG: 2 INJECTION INTRAMUSCULAR; INTRAVENOUS at 10:07

## 2021-08-24 RX ADMIN — ACETAMINOPHEN 975 MG: 325 TABLET ORAL at 08:59

## 2021-08-24 RX ADMIN — Medication 50 MG: at 09:39

## 2021-08-24 RX ADMIN — CEFAZOLIN SODIUM 2 G: 2 INJECTION, SOLUTION INTRAVENOUS at 09:35

## 2021-08-24 RX ADMIN — KETAMINE HYDROCHLORIDE 30 MG: 10 INJECTION INTRAMUSCULAR; INTRAVENOUS at 09:38

## 2021-08-24 RX ADMIN — GLYCOPYRROLATE 0.2 MG: 0.2 INJECTION, SOLUTION INTRAMUSCULAR; INTRAVENOUS at 09:39

## 2021-08-24 RX ADMIN — FENTANYL CITRATE 100 MCG: 50 INJECTION, SOLUTION INTRAMUSCULAR; INTRAVENOUS at 09:38

## 2021-08-24 RX ADMIN — LIDOCAINE HYDROCHLORIDE 2 ML: 20 INJECTION, SOLUTION INFILTRATION; PERINEURAL at 09:38

## 2021-08-24 NOTE — ANESTHESIA POSTPROCEDURE EVALUATION
Patient: Roberto Belle    Procedure(s):  CYSTOSCOPY, WITH RETROGRADE PYELOGRAM, LASER LITHOTRIPSY, CALCULUS REMOVAL AND URETERAL STENT REPLACEMENT    Diagnosis:Calculus of kidney [N20.0]  Diagnosis Additional Information: No value filed.    Anesthesia Type:  General    Note:  Disposition: Outpatient   Postop Pain Control: Uneventful            Sign Out: Well controlled pain   PONV: No   Neuro/Psych: Uneventful            Sign Out: Acceptable/Baseline neuro status   Airway/Respiratory: Uneventful            Sign Out: Acceptable/Baseline resp. status   CV/Hemodynamics: Uneventful            Sign Out: Acceptable CV status; No obvious hypovolemia; No obvious fluid overload   Other NRE: NONE   DID A NON-ROUTINE EVENT OCCUR? No           Last vitals:  Vitals Value Taken Time   /66 08/24/21 1100   Temp 97.5  F (36.4  C) 08/24/21 1041   Pulse 64 08/24/21 1105   Resp 16 08/24/21 1100   SpO2 94 % 08/24/21 1105   Vitals shown include unvalidated device data.    Electronically Signed By: Celi Latham MD  August 24, 2021  11:15 AM

## 2021-08-24 NOTE — DISCHARGE INSTRUCTIONS
You received 975 mg of acetaminophen (Tylenol) at 0900am. Do not exceed 4,000 mg of acetaminophen during a 24 hour period and keep in mind that acetaminophen can also be found in many over-the-counter cold medications as well as narcotics that may be given for pain. You may take 2 Extra strength Tylenol every 6 hours after 3 pm.  You received a medication called Toradol (a stronger IV ibuprofen) at 920am. Do NOT take any Ibuprofen / Advil / Aleve / Naproxen or products containing Ibuprofen until 320pm or later.  You may Take Ibuprofen 600 mg every 6 hours with food after 920 pm.    Going Home With a Ureteral Stent    What is it?  A stent is a soft, plastic tube that helps urine (pee) drain into the bladder.  During the surgery, it is placed in the ureter the tube that connects the kidney to the bladder.  A thin curl at each end of the stent keeps one end in your kidney and the other in your bladder.  The stent can not be seen from outside of the body.    Why Do I Have It?  Some sort of blockage is not letting pee drain into your bladder.  This could be from a stone, certain surgeries or kidney infection.    What Should I Expect?   Stents often cause some discomfort.  You may have:    The need to pee suddenly    Pain when you pee    A dull backache, which may get worse when you pee  Blood in your pee (color of fruit punch) and some clots, which may increase with physical activity.     What Can I Do To Feel Better?    Drink a little more fluids than usual.  You can eat your normal diet.    Enjoy a warm bath.  Decrease your activity.  Some people find bending or twisting movement cause discomfort or increased blood in the urine.  Even so, you will not harm yourself.      Discharge Instructions: After Your Surgery  You ve just had surgery. During surgery, you were given medicine called anesthesia to keep you relaxed and free of pain. After surgery, you may have some pain or nausea. This is common. Here are some tips for  feeling better and getting well after surgery.  Going home  Your healthcare provider will show you how to take care of yourself when you go home. He or she will also answer your questions. Have an adult family member or friend drive you home. For the first 24 hours after your surgery:    Don't drive or use heavy equipment.    Don't make important decisions or sign legal papers.    Don't drink alcohol.    Have someone stay with you. He or she can watch for problems and help keep you safe.  Be sure to go to all follow-up visits with your healthcare provider. And rest after your surgery for as long as your healthcare provider tells you to.  Coping with pain  If you have pain after surgery, pain medicine will help you feel better. Take it as told, before pain becomes severe. Also, ask your healthcare provider or pharmacist about other ways to control pain. This might be with heat, ice, or relaxation. And follow any other instructions your surgeon or nurse gives you.  Tips for taking pain medicine  To get the best relief possible, remember these points:    Pain medicines can upset your stomach. Taking them with a little food may help.    Most pain relievers taken by mouth need at least 20 to 30 minutes to start to work.    Don't wait till your pain becomes severe before you take your medicine. Try to time your medicine so that you can take it before starting an activity. This might be before you get dressed, go for a walk, or sit down for dinner.    Constipation is a common side effect of pain medicines. You may need laxatives or stool softeners to help ease constipation.  Drinking lots of fluids and eating foods such as fruits and vegetables that are high in fiber can also help.    Drinking alcohol and taking pain medicine can cause dizziness and slow your breathing. It can even be deadly. Don't drink alcohol while taking pain medicine.    Pain medicine can make you react more slowly to things. Don't drive or run  machinery while taking pain medicine.  Your healthcare provider may tell you to take acetaminophen to help ease your pain. Ask him or her how much you are supposed to take each day. Acetaminophen or other pain relievers may interact with your prescription medicines or other over-the-counter (OTC) medicines. Some prescription medicines have acetaminophen and other ingredients. Using both prescription and OTC acetaminophen for pain can cause you to overdose. Read the labels on your OTC medicines with care. This will help you to clearly know the list of ingredients, how much to take, and any warnings. It may also help you not take too much acetaminophen. If you have questions or don't understand the information, ask your pharmacist or healthcare provider to explain it to you before you take the OTC medicine.  Managing nausea  Some people have an upset stomach after surgery. This is often because of anesthesia, pain, or pain medicine, or the stress of surgery. These tips will help you handle nausea and eat healthy foods as you get better. If you were on a special food plan before surgery, ask your healthcare provider if you should follow it while you get better. These tips may help:    Don't push yourself to eat. Your body will tell you when to eat and how much.    Start off with clear liquids and soup. They are easier to digest.    Next try semi-solid foods, such as mashed potatoes, applesauce, and gelatin, as you feel ready.    Slowly move to solid foods. Don t eat fatty, rich, or spicy foods at first.    Don't force yourself to have 3 large meals a day. Instead eat smaller amounts more often.    Take pain medicines with a small amount of solid food, such as crackers or toast, to prevent nausea.  When to call your healthcare provider  Call your healthcare provider if:    You still have intolerable pain an hour after taking medicine. The medicine may not be strong enough.    You feel too sleepy, dizzy, or groggy. The  medicine may be too strong.    You have side effects such as nausea or vomiting, or skin changes such as rash, itching, or hives. Your healthcare provider may suggest other medicines to control side effects.  Rash, itching, or hives may mean you have an allergic reaction. Report this right away. If you have trouble breathing or facial swelling, call 911 right away.  If you have obstructive sleep apnea  You were given anesthesia medicine during surgery to keep you comfortable and free of pain. After surgery, you may have more apnea spells because of this medicine and other medicines you were given. The spells may last longer than usual.   At home:    Keep using the continuous positive airway pressure (CPAP) device when you sleep. Unless your healthcare provider tells you not to, use it when you sleep, day or night. CPAP is a common device used to treat obstructive sleep apnea.    Talk with your provider before taking any pain medicine, muscle relaxants, or sedatives. Your provider will tell you about the possible dangers of taking these medicines.  [employer] last reviewed this educational content on 3/1/2019    8055-5232 [employer]. All rights reserved. This information is not intended as a substitute for professional medical care. Always follow your healthcare professional's instructions.                                                          [facility] [place]                                                        Stent Removal With String    -Take Tylenol or Ibuprofen and drink fluids 1 hour prior to removing your stent at home.    -Remove the stent in the morning on the specific day as directed by your doctor.  Gently pull on the string in the shower while urinating until the stent is completely removed.    -Call [facility] if you have questions or concerns [phone]    -What To Expect After Your Stent Is Removed    -After stent removal, urine may be bloody and possibly have some bloody  "clots.    -You may experience an \"achy\" pain due to urethral spasms.  This generally only lasts a few hours, but should resolve over the next 2-3 days    "

## 2021-08-24 NOTE — ANESTHESIA PROCEDURE NOTES
Airway       Patient location during procedure: OR       Procedure Start/Stop Times: 8/24/2021 9:48 AM  Staff -        Anesthesiologist:  Jarrod Bray MD       Performed By: anesthesiologist  Consent for Airway        Urgency: elective  Indications and Patient Condition       Indications for airway management: isaac-procedural       Induction type:intravenous       Mask difficulty assessment: 0 - not attempted    Final Airway Details       Final airway type: endotracheal airway       Successful airway: ETT - single  Endotracheal Airway Details        ETT size (mm): 7.5       Cuffed: yes       Successful intubation technique: direct laryngoscopy       DL Blade Type: Cabrera 2       Grade View of Cords: 2       Adjucts: stylet and tooth guard       Position: Right       Measured from: gums/teeth       Secured at (cm): 22       Bite block used: None    Post intubation assessment        Placement verified by: capnometry, equal breath sounds and chest rise        Number of attempts at approach: 1       Secured with: silk tape       Ease of procedure: easy       Dentition: Intact

## 2021-08-24 NOTE — ANESTHESIA CARE TRANSFER NOTE
Patient: Roberto Belle    Procedure(s):  CYSTOSCOPY, WITH RETROGRADE PYELOGRAM, LASER LITHOTRIPSY, CALCULUS REMOVAL AND URETERAL STENT REPLACEMENT    Diagnosis: Calculus of kidney [N20.0]  Diagnosis Additional Information: No value filed.    Anesthesia Type:   General     Note:    Oropharynx: oropharynx clear of all foreign objects and spontaneously breathing  Level of Consciousness: drowsy  Oxygen Supplementation: face mask  Level of Supplemental Oxygen (L/min / FiO2): 8L/min  Independent Airway: airway patency satisfactory and stable  Dentition: dentition unchanged  Vital Signs Stable: post-procedure vital signs reviewed and stable  Report to RN Given: handoff report given  Patient transferred to: PACU    Handoff Report: Identifed the Patient, Identified the Reponsible Provider, Reviewed the pertinent medical history, Discussed the surgical course, Reviewed Intra-OP anesthesia mangement and issues during anesthesia, Set expectations for post-procedure period and Allowed opportunity for questions and acknowledgement of understanding      Vitals:  Vitals Value Taken Time   /71 08/24/21 1041   Temp 97.5  F (36.4  C) 08/24/21 1041   Pulse 63 08/24/21 1041   Resp 16 08/24/21 1041   SpO2 98 % 08/24/21 1041       Electronically Signed By: Jarrod Bray MD  August 24, 2021  10:44 AM

## 2021-08-24 NOTE — ANESTHESIA PREPROCEDURE EVALUATION
Anesthesia Pre-Procedure Evaluation    Patient: Roberto Belle   MRN: [id number] : 1955        Preoperative Diagnosis: Calculus of kidney [N20.0]   Procedure : Procedure(s):  CYSTOSCOPY, WITH RETROGRADE PYELOGRAM, LASER LITHOTRIPSY, CALCULUS REMOVAL AND URETERAL STENT REPLACEMENT     Past Medical History:   Diagnosis Date     Arthritis      Calculus of kidney 2021     Hypertension       Past Surgical History:   Procedure Laterality Date     COMBINED CYSTOSCOPY, INSERT STENT URETER(S) Left 2021    Procedure: CYSTOURETEROSCOPY, WITH RETROGRADE PYELOGRAM, LASER LITHOTRIPSY, CALCULUS REMOVAL AND URETERAL STENT INSERTION;  Surgeon: Aneesh Michael MD;  [facility]     UNDESCENDED TESTICLE EXPLORATION Left       No Known Allergies   Social History     Tobacco Use     Smoking status: Never Smoker     Smokeless tobacco: Never Used   Substance Use Topics     Alcohol use: Yes     Alcohol/week: 2.0 standard drinks     Comment: 1-2/week      Wt Readings from Last 1 Encounters:   21 81.1 kg (178 lb 14.4 oz)        Anesthesia Evaluation   Pt has had prior anesthetic.     No history of anesthetic complications       ROS/MED HX  ENT/Pulmonary:  - neg pulmonary ROS     Neurologic:  - neg neurologic ROS     Cardiovascular:     (+) Dyslipidemia hypertension-----    METS/Exercise Tolerance: >4 METS    Hematologic:  - neg hematologic  ROS     Musculoskeletal:  - neg musculoskeletal ROS     GI/Hepatic:  - neg GI/hepatic ROS     Renal/Genitourinary:     (+) renal disease (Nephrolithiasis),     Endo:  - neg endo ROS     Psychiatric/Substance Use:       Infectious Disease:       Malignancy:       Other:            Physical Exam    Airway        Mallampati: I   TM distance: > 3 FB   Neck ROM: full   Mouth opening: > 3 cm    Respiratory Devices and Support         Dental       (+) caps    B=Bridge, C=Chipped, L=Loose, M=Missing    Cardiovascular   cardiovascular exam normal          Pulmonary    pulmonary exam normal                OUTSIDE LABS:  CBC:   Lab Results   Component Value Date    WBC 6.3 06/22/2021    WBC 7.0 01/21/2021    HGB 15.7 06/22/2021    HGB 16.6 01/21/2021    HCT 45.7 06/22/2021    HCT 49.8 01/21/2021     06/22/2021     01/21/2021     BMP:   Lab Results   Component Value Date     06/22/2021     01/21/2021    POTASSIUM 4.5 06/22/2021    POTASSIUM 4.2 01/21/2021    CHLORIDE 104 06/22/2021    CHLORIDE 104 01/21/2021    CO2 25 06/22/2021    CO2 27 01/21/2021    BUN 23 (H) 06/22/2021    BUN 16 01/21/2021    CR 0.83 06/22/2021    CR 0.84 01/21/2021    GLC 89 06/22/2021    GLC 87 01/21/2021     COAGS: No results found for: PTT, INR, FIBR  POC: No results found for: BGM, HCG, HCGS  HEPATIC:   Lab Results   Component Value Date    ALBUMIN 4.4 06/22/2021    PROTTOTAL 7.2 06/22/2021    ALT 17 06/22/2021    AST 30 06/22/2021    ALKPHOS 88 06/22/2021    BILITOTAL 0.6 06/22/2021     OTHER:   Lab Results   Component Value Date    MICHELLE 10.2 06/22/2021       Anesthesia Plan    ASA Status:  2      Anesthesia Type: General.     - Airway: ETT   Induction: Propofol, Intravenous.   Maintenance: TIVA.        Consents    Anesthesia Plan(s) and associated risks, benefits, and realistic alternatives discussed. Questions answered and patient/representative(s) expressed understanding.     - Discussed with:  Patient      - Extended Intubation/Ventilatory Support Discussed: No.      - Patient is DNR/DNI Status: No    Use of blood products discussed: No .     Postoperative Care    Pain management: IV analgesics, Multi-modal analgesia, Oral pain medications.   PONV prophylaxis: Ondansetron (or other 5HT-3), Dexamethasone or Solumedrol     Comments:    Ketamine  Magnesium  Pre op acetaminophen      Reverse with Sugammadex              Jarrod Bray MD

## 2021-08-27 LAB
APPEARANCE STONE: NORMAL
COMPN STONE: NORMAL
NUMBER STONE: 4
SIZE STONE: NORMAL MM
SPECIMEN WT: 21 MG

## 2021-09-01 ENCOUNTER — TELEPHONE (OUTPATIENT)
Dept: UROLOGY | Facility: CLINIC | Age: 66
End: 2021-09-01

## 2021-09-01 DIAGNOSIS — N20.0 CALCULUS OF KIDNEY: Primary | ICD-10-CM

## 2021-09-01 NOTE — OP NOTE
[facility]  Kidney [facility] Operative Note    Robreto Belle   September 1, 2021 8/24/2021   Sheldon Harvey     Procedure Performed  Procedure(s):  CYSTOSCOPY, WITH RETROGRADE PYELOGRAM, LASER LITHOTRIPSY, CALCULUS REMOVAL AND URETERAL STENT REPLACEMENT  1. Cystoscopy  2. Ureteral Stent Extraction - Left  3. Ureteroscopic Laser Lithotripsy - Left Kidney    4. Ureteral Stent Insertion - Left      Pre-operative Diagnosis  Calculus of kidney [N20.0]    Post-operative Diagnosis  1. Stone Left Kidney        Surgeon(s) and Role:     * Aneesh Michael MD - Primary    Anesthesia Type  Not documented     Procedural Summary    Estimation of stone clearance: <2 mm residual  Subjective stone composition: calcium  Renal papillae assessment: plaques mild  Unanticipated event/findings: none  Post-operative plan: remove own stent in 1 week with extraction string return to clinic in 1 month with CT scan    Narrative    Challenging secondary clearance of innumerable small spherical renal calculi after primary clearance of impacted mid pole infundibular stone. Impossible to clear all stones but confident that no discrete residual >2 mm.    Procedural Details    Patient is brought to the surgical suite where Not documented anesthesia is induced. he is prepped and draped in standard fashion in combined Trendelenberg and lithotomy position.    Cystoscopy: Rigid cystoscopy is performed. Anterior and posterior urethra are normal. Prostate demonstrates moderate adenopathy. Bladder has mucosal inflammation consistent with indwelling ureteral stent..     Stent Extraction: Indwelling left ureteral stent is secured and the distal end brought out to urethral meatus.      Ureteral Access: Left ureteral access is initiated with Sensor wire. Guidewire is placed to kidney through lumen of ureteral stent. Guide wire access to the kidney is assured. 8F dilator is inserted with minimal resistance. 10F dilator is inserted with minimal  resistance. 12F ureteral access sheath obturator is inserted with minimal resistance. 14F 36 cm ureteral access sheath is inserted with minimal resistance.      Flexible Ureteroscopy: Flexible ureteroscope is passed to kidney. Left lower pole and mid pole stones are identified. Stones are mobile. Innumerable small spherical stones are encountered. With high power holmium laser, an attempt was made to fragment the small stones but it was very difficult to make defnitive contact. Many small stones were extracted. Impossible to extract all stones. Confident that there were no stones >2 mm residual stones.      Ureteral Stent Insertion: Left 7F 26 cm stent is inserted with good coil in kidney and bladder under fluoroscopic guidance. Stent extraction string left dangling from urethra.      The patient was then taken to the recovery room in good condition.     Past Medical History:   Diagnosis Date     Arthritis      Calculus of kidney 8/9/2021     Hypertension         Patient Active Problem List   Diagnosis     Epididymitis     Hypercholesterolemia     Essential Hypertension     Fatigue     Taking Medication For  A Long Time     Vaccines Prophylactic Needed Against Influenza     Calculus of ureter     Calculus of kidney        Estimated Blood Loss  .* No values recorded between 8/24/2021  9:48 AM and 8/24/2021 10:39 AM *     ID Type Source Tests Collected by Time Destination   A : Left Kidney Stone Calculus/Stone Other STONE ANALYSIS Aneesh Michael MD 8/24/2021 10:09 AM

## 2021-09-01 NOTE — TELEPHONE ENCOUNTER
[facility] clinical support calling regarding with patient on how his ureteral stent on a string removal at home.  Patient states he was able remove his stent without difficulty better than he anticipated.  He had had no symptoms or complications since the removal.  Patient will await scheduling call for a 1 month post op follow up with a CT scan.

## 2021-09-18 ENCOUNTER — HEALTH MAINTENANCE LETTER (OUTPATIENT)
Age: 66
End: 2021-09-18

## 2021-10-25 ENCOUNTER — HOSPITAL ENCOUNTER (OUTPATIENT)
Dept: CT IMAGING | Facility: CLINIC | Age: 66
Discharge: HOME | End: 2021-10-25
Admitting: UROLOGY
Payer: MEDICARE

## 2021-10-25 DIAGNOSIS — N20.0 CALCULUS OF KIDNEY: ICD-10-CM

## 2021-10-25 PROCEDURE — 74176 CT ABD & PELVIS W/O CONTRAST: CPT

## 2021-10-26 ENCOUNTER — TELEPHONE (OUTPATIENT)
Dept: UROLOGY | Facility: CLINIC | Age: 66
End: 2021-10-26

## 2021-10-26 ENCOUNTER — VIRTUAL VISIT (OUTPATIENT)
Dept: UROLOGY | Facility: CLINIC | Age: 66
End: 2021-10-26
Payer: MEDICARE

## 2021-10-26 DIAGNOSIS — N20.0 URIC ACID NEPHROLITHIASIS: Primary | ICD-10-CM

## 2021-10-26 PROCEDURE — 99442 PR PHYSICIAN TELEPHONE EVALUATION 11-20 MIN: CPT | Mod: 95 | Performed by: UROLOGY

## 2021-10-26 ASSESSMENT — PAIN SCALES - GENERAL: PAINLEVEL: NO PAIN (0)

## 2021-10-26 NOTE — PROGRESS NOTES
Assessment/Plan:    Assessment & Plan   Roberto was seen today for post-op visit.    Diagnoses and all orders for this visit:    Uric acid nephrolithiasis  -     Renal Function Panel ([facility])  -     Uric acid; Future  -     Stone formation, timed urine; Future  -     Patient Stated Goal: Prevent further stones        Stone Management Plan  Stone Management 7/8/2021 10/26/2021   Urinary Tract Infection No suspicion of infection No suspicion of infection   Renal Colic Well controlled symptoms Asymptomatic at this time   Renal Failure No suspicion of renal failure No suspicion of renal failure   Current CT date 7/7/2021 10/26/2021   Right sided stones? No No   R Stone Event No current event No current event   Left sided stones? Yes No   L Number of ureteral stones No ureteral stones -   L Number of kidney stones  2 -   L GSD of kidney stones 4 - 10 -   L Hydronephrosis Mild -   L Stone Event New event Resolved event   Diagnosis date 7/7/2021 -   Initial location of primary symptomatic stone Renal -   Initial GSD of primary symptomatic stone 9 -   Resolved date - 10/26/2021   Post-op status - No residual stone   L Current Plan Clear -   Clear rationale Poor prognosis -             PLAN    Phone call duration: 10 minutes  15 minutes spent on the date of the encounter doing chart review, history and exam, documentation and further activities per the note    CAROLINA LOUISE MD  [facility]      HPI  Mr. Roberto Belle is a 65 year old  male who is being evaluated via a billable video visit by [facility] [place] for late postoperative follow-up.     He returns status post Left ureteroscopic laser lithotripsy for renal stone. He has had no unanticipated post-operative events.     He is asymptomatic at present. He denies symptoms of fever, chills, flank pain, nausea, vomiting, urinary frequency and dysuria.    New CT scan was personally reviewed and  demonstrates complete clearance of targeted stone  with no hydronephrosis.     Stone composition was 70 % calcium oxalate and 30 % uric acid.     He is at risk for ongoing active stone disease and will initiate stone risk evaluation. Serum stone risk chemistries will be obtained before next visit. Two 24 hour urine collections and dietary journal will be obtained at earliest covenience..    ROS   Review of systems is negative except for HPI.    Past Medical History:   Diagnosis Date     Arthritis      Calculus of kidney 8/9/2021     Hypertension        Past Surgical History:   Procedure Laterality Date     COMBINED CYSTOSCOPY, INSERT STENT URETER(S) Left 8/6/2021    Procedure: CYSTOURETEROSCOPY, WITH RETROGRADE PYELOGRAM, LASER LITHOTRIPSY, CALCULUS REMOVAL AND URETERAL STENT INSERTION;  Surgeon: Aneesh Michael MD;  Location: [facility] OR     COMBINED CYSTOSCOPY, RETROGRADES, EXCHANGE STENT URETER(S) Left 8/24/2021    Procedure: CYSTOSCOPY, WITH RETROGRADE PYELOGRAM, LASER LITHOTRIPSY, CALCULUS REMOVAL AND URETERAL STENT REPLACEMENT;  Surgeon: Aneesh Michael MD;  Location: [facility] OR     UNDESCENDED TESTICLE EXPLORATION Left 1968       Current Outpatient Medications   Medication Sig Dispense Refill     B complex 11-folic-C-biot-zinc 2-248-001-50 mg-mg-mcg-mg Tab [B COMPLEX 11-FOLIC-C-BIOT-ZINC 8-743-211-50 MG-MG-MCG-MG TAB] Take 1 tablet by mouth every other day.        co-enzyme Q-10 30 mg capsule [CO-ENZYME Q-10 30 MG CAPSULE] Take 30 mg by mouth 2 (two) times a day.        lisinopriL (PRINIVIL,ZESTRIL) 2.5 MG tablet [LISINOPRIL (PRINIVIL,ZESTRIL) 2.5 MG TABLET] Take 1 tablet (2.5 mg total) by mouth daily. 90 tablet 2     loratadine (CLARITIN) 10 mg tablet [LORATADINE (CLARITIN) 10 MG TABLET] Take 10 mg by mouth daily.       MEDICATION CANNOT BE REORDERED - PLEASE MANUALLY REORDER AND DISCONTINUE THE OLD ORDER [PHYTOSTEROL COMBINATION NO.1 500 MG CAP] Take by mouth. Take 2 tablets by mouth        MULTIVITAMIN ORAL [MULTIVITAMIN ORAL] Take 1 tablet by mouth daily.              OMEGA-3/DHA/EPA/FISH OIL (FISH OIL-OMEGA-3 FATTY ACIDS) 300-1,000 mg capsule [OMEGA-3/DHA/EPA/FISH OIL (FISH OIL-OMEGA-3 FATTY ACIDS) 300-1,000 MG CAPSULE] Take 2 g by mouth daily.       simvastatin (ZOCOR) 20 MG tablet [SIMVASTATIN (ZOCOR) 20 MG TABLET] Take 1 tablet (20 mg total) by mouth at bedtime. 90 tablet 3       No Known Allergies    Social History     Socioeconomic History     Marital status:      Spouse name: Not on file     Number of children: 2     Years of education: Not on file     Highest education level: Not on file   Occupational History     Not on file   Tobacco Use     Smoking status: Never Smoker     Smokeless tobacco: Never Used   Substance and Sexual Activity     Alcohol use: Yes     Alcohol/week: 2.0 standard drinks     Comment: 1-2/week     Drug use: Not Currently     Sexual activity: Not on file   Other Topics Concern     Not on file   Social History Narrative     Not on file     Social Determinants of Health     Financial Resource Strain:      Difficulty of Paying Living Expenses:    Food Insecurity:      Worried About Running Out of Food in the Last Year:      Ran Out of Food in the Last Year:    Transportation Needs:      Lack of Transportation (Medical):      Lack of Transportation (Non-Medical):    Physical Activity:      Days of Exercise per Week:      Minutes of Exercise per Session:    Stress:      Feeling of Stress :    Social Connections:      Frequency of Communication with Friends and Family:      Frequency of Social Gatherings with Friends and Family:      Attends Yazdanism Services:      Active Member of Clubs or Organizations:      Attends Club or Organization Meetings:      Marital Status:    Intimate Partner Violence:      Fear of Current or Ex-Partner:      Emotionally Abused:      Physically Abused:      Sexually Abused:        Family History   Problem Relation Age of Onset     Heart  Disease Father      Snoring Father      Diabetes Father      Breast Cancer Mother      Other - See Comments Mother         Cancer of the jaw     Macular Degeneration Mother      Diabetes Type 2  Brother        Objective:     Appears AAO x 3  No vitals obtained due to virtual visit    Labs

## 2021-10-26 NOTE — PROGRESS NOTES
Patient is roomed via telephone for a virtual visit.  Patient confirmed he is in the [facility] at the time of this appointment.  Patient understands that this virtual visit is billable and agree to proceed with appointment.

## 2021-10-26 NOTE — PATIENT INSTRUCTIONS
Patient Stated Goal: Prevent further stones  Steps for collecting a 24 hour urine specimen    Please follow the directions carefully. All urine voided for a 24-hour period needs to be collected into the jug.  DO NOT change any of your  normal  daily habits when doing this test. Continue to follow your regular diet, intake of fluids, and usual activity level. Pick the most convenient day with your schedule, perhaps on a weekend or a day off.    Start your Diet Log the day before collection and continue on the day of urine collection.  You MUST bring Diet Log with you on follow up visit to discuss results.    One 24hr Urine Collection     Two 24hr Urine Collections  (do not collect on consecutive days)    PLEASE COMPLETE THE 2nd JUG WITHIN 1-2 WEEKS FROM THE 1st JUG    STEP 1  Empty your bladder completely into the toilet. This will be your start time. Write your full legal name, start date and time on the jug label.  Collection start and stop times need to match exactly!  For example:  6 am to 6 am.    STEP 2  The next time you urinate, empty your bladder directly into the jug or collection hat and pour urine into the jug.  Screw the lid back onto the jug.  Do not spill!    STEP 3  Place the jug in the refrigerator or a cooler with ice during the collection period.  Failure to keep it cool could cause inaccurate test results. DO NOT Freeze.    STEP 4  Continue collecting all urine into the jug for the rest of the day, for the full 24 hours.  DO NOT stop early or go over 24 hours!    STEP 5  Exactly 24 hours from start of collection, write your full legal name, stop date and time on the jug label.   Collection start and stop times need to match exactly!  For example:  6 am to 6 am.  Failure to label correctly will result in recollection of urine specimen.    STEP 6  Return each jug within 24 hours after final urination.     STEP 7  Drop off jug locations:       STEP 8  Please call [facility] after return of your final jug  to schedule your follow-up visit. [phone]

## 2021-11-09 ENCOUNTER — LAB (OUTPATIENT)
Dept: LAB | Facility: CLINIC | Age: 66
End: 2021-11-09
Payer: MEDICARE

## 2021-11-09 ENCOUNTER — TRANSFERRED RECORDS (OUTPATIENT)
Dept: HEALTH INFORMATION MANAGEMENT | Facility: CLINIC | Age: 66
End: 2021-11-09

## 2021-11-09 DIAGNOSIS — N20.0 URIC ACID NEPHROLITHIASIS: ICD-10-CM

## 2021-11-09 LAB — URATE SERPL-MCNC: 5.9 MG/DL (ref 3–8)

## 2021-11-09 PROCEDURE — 36415 COLL VENOUS BLD VENIPUNCTURE: CPT

## 2021-11-09 PROCEDURE — 84550 ASSAY OF BLOOD/URIC ACID: CPT

## 2021-11-19 ENCOUNTER — LAB (OUTPATIENT)
Dept: LAB | Facility: CLINIC | Age: 66
End: 2021-11-19
Payer: MEDICARE

## 2021-11-19 DIAGNOSIS — N20.0 CALCULUS OF KIDNEY: Primary | ICD-10-CM

## 2021-11-19 DIAGNOSIS — N20.0 URIC ACID NEPHROLITHIASIS: ICD-10-CM

## 2021-11-19 LAB
ALBUMIN SERPL-MCNC: 4.1 G/DL (ref 3.5–5)
ANION GAP SERPL CALCULATED.3IONS-SCNC: 9 MMOL/L (ref 5–18)
BUN SERPL-MCNC: 18 MG/DL (ref 8–22)
CALCIUM SERPL-MCNC: 10 MG/DL (ref 8.5–10.5)
CHLORIDE BLD-SCNC: 107 MMOL/L (ref 98–107)
CO2 SERPL-SCNC: 27 MMOL/L (ref 22–31)
CREAT SERPL-MCNC: 0.92 MG/DL (ref 0.7–1.3)
GFR SERPL CREATININE-BSD FRML MDRD: 87 ML/MIN/1.73M2
GLUCOSE BLD-MCNC: 94 MG/DL (ref 70–125)
PHOSPHATE SERPL-MCNC: 2.7 MG/DL (ref 2.5–4.5)
POTASSIUM BLD-SCNC: 4.1 MMOL/L (ref 3.5–5)
SODIUM SERPL-SCNC: 143 MMOL/L (ref 136–145)

## 2021-11-19 PROCEDURE — 80069 RENAL FUNCTION PANEL: CPT

## 2021-11-19 PROCEDURE — 36415 COLL VENOUS BLD VENIPUNCTURE: CPT

## 2021-11-23 ENCOUNTER — VIRTUAL VISIT (OUTPATIENT)
Dept: UROLOGY | Facility: CLINIC | Age: 66
End: 2021-11-23
Payer: MEDICARE

## 2021-11-23 DIAGNOSIS — N20.0 CALCULUS OF KIDNEY: Primary | ICD-10-CM

## 2021-11-23 PROCEDURE — 99213 OFFICE O/P EST LOW 20 MIN: CPT | Mod: 95 | Performed by: UROLOGY

## 2021-11-23 ASSESSMENT — PAIN SCALES - GENERAL: PAINLEVEL: NO PAIN (0)

## 2021-11-23 NOTE — PATIENT INSTRUCTIONS
Patient Stated Goal: Prevent further stones  Calcium Oxalate Stone Prevention Self Management    Drink more fluids:    Drinking more liquids is the best way you can help prevent future stones. Stones can form when substances in the urine are too concentrated. The more you drink, the more urine you will make. This means all substances in the urine will be less concentrated.    How much urine should I be producing?    The usual recommended daily urine production is about 2 to 3 quarts (5729-8197 ml). If you are producing more than 3 quarts of urine on a regular basis, it is possible to deplete important minerals stored in the body.    To measure the amount of urine you produce in a day, you can either:    Collect all urine in a container and measure at the end of the day     Use a measuring cup each time you urinate and add up the amounts at the end of the day     Observe    Color - Dark dario urine is concentrated. Light straw color or lighter is dilute and desirable     Odor - Concentrated urine tends to smell stronger. Dilute urine is nearly odorless    Ways to increase your fluid intake    Increasing the amount of fluid you drink is effective for all types of kidney stones. While water is commonly recommended, all fluids are effective for increasing the amount of urine your body produces.    Focus on starting a lifelong habit, rather than a short-term solution.     Keep liquids on hand that you like. Crystal Light is a low calorie appropriate choice.    Drink out of larger glasses. You'll tend to drink more with each serving.     Have an additional glass of fluid with each meal.     Keep a water or drink bottle at work and fill it regularly.     *If you are prone to fluid retention, consult your doctor before making changes to your fluid habits.    Low Oxalate Diet:    Avoid excess amounts or daily consumption of these foods:    All nuts and nut products including peanuts, almonds, pecans, peanut butter, almond  milk    Rhubarb    Chocolate    Soybeans and soy products     Spinach    Wheat Germ    Beets    Maintain a normal calcium diet:    Researches have found that people with low calcium intakes tend to have more stones. Foods with high calcium content are acceptable and include:    Dairy products (including milk, cheese and yogurt)    Meat and fish    Enriched cereals    Dark green vegetables    What about calcium supplements?     Many people take calcium supplements, either on their own or as prescribed by a doctor. Research has indicated that calcium supplements do not usually pose a risk for stone formation.  Calcium citrate is a better choice for a supplement.    Avoid excess salt:    Salt (sodium chloride) is found in abundance in many foods. High sodium levels in the urine can interfere with the kidney's handling of calcium.     Tips for reducing the salt in your diet:    Don't use salt at the table    Reduce the salt used in food preparation. Try 1/2 teaspoon when recipes call for 1 teaspoon.    Use herbs and spices for flavoring instead of salt.    Avoid salty foods.    Check the label before you buy or use a product. Note sodium and portion size information.    Try to consume less than 2,000 mg/day. (1 teaspoon = 2,000 mg)    Foods with high sodium content include:    Processed meat (including luncheon meats, sausage)     Crackers     Instant cereal     Processed cheese     Canned soups     Chips and snack foods     Soy sauce    [facility] can respond to your questions or concerns 24 hours a day at [phone].

## 2021-11-23 NOTE — PROGRESS NOTES
Patient is roomed via telephone for a virtual visit.  Patient confirmed he is in the [facility] at the time of this appointment.  Patient understands that this virtual visit is billable and agree to proceed with appointment.

## 2021-11-23 NOTE — PROGRESS NOTES
Assessment/Plan:    Assessment & Plan   Roberto was seen today for stone prevention.    Diagnoses and all orders for this visit:    Calculus of kidney  -     Patient Stated Goal: Prevent further stones  -     CT Abdomen Pelvis w/o Contrast; Future        Stone Management Plan  Stone Management 7/8/2021 10/26/2021 11/23/2021   Urinary Tract Infection No suspicion of infection No suspicion of infection No suspicion of infection   Renal Colic Well controlled symptoms Asymptomatic at this time Asymptomatic at this time   Renal Failure No suspicion of renal failure No suspicion of renal failure No suspicion of renal failure   Current CT date 7/7/2021 10/26/2021 -   Right sided stones? No No -   R Stone Event No current event No current event No current event   Left sided stones? Yes No -   L Number of ureteral stones No ureteral stones - -   L Number of kidney stones  2 - -   L GSD of kidney stones 4 - 10 - -   L Hydronephrosis Mild - -   L Stone Event New event Resolved event No current event   Diagnosis date 7/7/2021 - -   Initial location of primary symptomatic stone Renal - -   Initial GSD of primary symptomatic stone 9 - -   Resolved date - 10/26/2021 -   Post-op status - No residual stone -   L Current Plan Clear - -   Clear rationale Poor prognosis - -             PLAN    Video call duration: 8 minutes  15 minutes spent on the date of the encounter doing chart review, history and exam, documentation and further activities per the note    CAROLINA LOUISE MD  [facility]    HPI  Mr. Roberto Belle is a 65 year old  male who is being evaluated via a billable video visit by [facility] for follow up of his stone disease.    He returns with initial risk evaluation for his multiple calcium oxalate stones.    24 hour urines look great with excellent urine volume (2.9, 3.0 l/d). He does report that he has increased his fluid intake since [facility]  and particularly since surgery.    I suspect that his prodigious stone burden was a combination of low urine volume and stasis from the impacted infundibular stone.    Will check a repeat CT in one year given the large volume stone with which he presented. He is encouraged to maintain his current fluid intake.    ROS   Review of systems is negative except for HPI.    Past Medical History:   Diagnosis Date     Arthritis      Calculus of kidney 8/9/2021     Hypertension        Past Surgical History:   Procedure Laterality Date     COMBINED CYSTOSCOPY, INSERT STENT URETER(S) Left 8/6/2021    Procedure: CYSTOURETEROSCOPY, WITH RETROGRADE PYELOGRAM, LASER LITHOTRIPSY, CALCULUS REMOVAL AND URETERAL STENT INSERTION;  Surgeon: Aneesh Michael MD;  Location: [facility] OR     COMBINED CYSTOSCOPY, RETROGRADES, EXCHANGE STENT URETER(S) Left 8/24/2021    Procedure: CYSTOSCOPY, WITH RETROGRADE PYELOGRAM, LASER LITHOTRIPSY, CALCULUS REMOVAL AND URETERAL STENT REPLACEMENT;  Surgeon: Aneesh Michael MD;  Location: [facility] OR     UNDESCENDED TESTICLE EXPLORATION Left 1968       Current Outpatient Medications   Medication Sig Dispense Refill     B complex 11-folic-C-biot-zinc [id number] mg-mg-mcg-mg Tab [B COMPLEX 11-FOLIC-C-BIOT-ZINC 6-381-221-50 MG-MG-MCG-MG TAB] Take 1 tablet by mouth every other day.        co-enzyme Q-10 30 mg capsule [CO-ENZYME Q-10 30 MG CAPSULE] Take 30 mg by mouth 2 (two) times a day.        lisinopriL (PRINIVIL,ZESTRIL) 2.5 MG tablet [LISINOPRIL (PRINIVIL,ZESTRIL) 2.5 MG TABLET] Take 1 tablet (2.5 mg total) by mouth daily. 90 tablet 2     MEDICATION CANNOT BE REORDERED - PLEASE MANUALLY REORDER AND DISCONTINUE THE OLD ORDER [PHYTOSTEROL COMBINATION NO.1 500 MG CAP] Take by mouth. Take 2 tablets by mouth       MULTIVITAMIN ORAL [MULTIVITAMIN ORAL] Take 1 tablet by mouth daily.              OMEGA-3/DHA/EPA/FISH OIL (FISH OIL-OMEGA-3 FATTY ACIDS) 300-1,000 mg capsule [OMEGA-3/DHA/EPA/FISH OIL  (FISH OIL-OMEGA-3 FATTY ACIDS) 300-1,000 MG CAPSULE] Take 2 g by mouth daily.       simvastatin (ZOCOR) 20 MG tablet [SIMVASTATIN (ZOCOR) 20 MG TABLET] Take 1 tablet (20 mg total) by mouth at bedtime. 90 tablet 3       No Known Allergies    Social History     Socioeconomic History     Marital status:      Spouse name: Not on file     Number of children: 2     Years of education: Not on file     Highest education level: Not on file   Occupational History     Not on file   Tobacco Use     Smoking status: Never Smoker     Smokeless tobacco: Never Used   Substance and Sexual Activity     Alcohol use: Yes     Alcohol/week: 2.0 standard drinks     Comment: 1-2/week     Drug use: Not Currently     Sexual activity: Not on file   Other Topics Concern     Not on file   Social History Narrative     Not on file     Social Determinants of Health     Financial Resource Strain: Not on file   Food Insecurity: Not on file   Transportation Needs: Not on file   Physical Activity: Not on file   Stress: Not on file   Social Connections: Not on file   Intimate Partner Violence: Not on file   Housing Stability: Not on file       Family History   Problem Relation Age of Onset     Heart Disease Father      Snoring Father      Diabetes Father      Breast Cancer Mother      Other - See Comments Mother         Cancer of the jaw     Macular Degeneration Mother      Diabetes Type 2  Brother        Objective:     Appears AAO x 3  No vitals obtained due to virtual visit    Labs   Most Recent 3 BMP's:Recent Labs   Lab Test 11/19/21  0947 06/22/21  1202 01/21/21  1603    142 141   POTASSIUM 4.1 4.5 4.2   CHLORIDE 107 104 104   CO2 27 25 27   BUN 18 23* 16   CR 0.92 0.83 0.84   ANIONGAP 9 13 10   MICHELLE 10.0 10.2 10.1   GLC 94 89 87     Most Recent Urinalysis:No lab results found.  Acute Labs Urine Culture  No results found for: CULTURE

## 2022-01-10 DIAGNOSIS — I10 ESSENTIAL HYPERTENSION: ICD-10-CM

## 2022-01-10 NOTE — TELEPHONE ENCOUNTER
Reason for Call:  Medication or medication refill:    Do you use a [facility]?  Name of the pharmacy and phone number for the current request:  [facility] pharmacy in [place]    Name of the medication requested: lisinopriL (PRINIVIL,ZESTRIL) 2.5 MG tablet    Other request: Pharmacy states they sent refill request 3x.      Can we leave a detailed message on this number? Not Applicable    Phone number: [phone]    Best Time: anytime    Call taken on 1/10/2022 at 3:53 PM by Rivka Linares

## 2022-01-10 NOTE — TELEPHONE ENCOUNTER
Medication:   Disp Refills Start End JADYN   lisinopriL (PRINIVIL,ZESTRIL) 2.5 MG tablet 90 tablet 2 4/21/2021  No   Sig - Route: [LISINOPRIL (PRINIVIL,ZESTRIL) 2.5 MG TABLET] Take 1 tablet (2.5 mg total) by mouth daily. - Oral       Pharmacy:  [id number] - [place], [place] - [address]  [phone]    Last OV:  6/22/2021

## 2022-01-11 RX ORDER — LISINOPRIL 2.5 MG/1
2.5 TABLET ORAL DAILY
Qty: 90 TABLET | Refills: 3 | Status: SHIPPED | OUTPATIENT
Start: 2022-01-11 | End: 2023-01-02

## 2022-01-31 ASSESSMENT — ACTIVITIES OF DAILY LIVING (ADL): CURRENT_FUNCTION: NO ASSISTANCE NEEDED

## 2022-02-03 ENCOUNTER — OFFICE VISIT (OUTPATIENT)
Dept: INTERNAL MEDICINE | Facility: CLINIC | Age: 67
End: 2022-02-03
Payer: MEDICARE

## 2022-02-03 VITALS
HEIGHT: 67 IN | OXYGEN SATURATION: 95 % | DIASTOLIC BLOOD PRESSURE: 78 MMHG | HEART RATE: 68 BPM | SYSTOLIC BLOOD PRESSURE: 114 MMHG | BODY MASS INDEX: 28.56 KG/M2 | WEIGHT: 182 LBS

## 2022-02-03 DIAGNOSIS — E78.00 HYPERCHOLESTEROLEMIA: ICD-10-CM

## 2022-02-03 DIAGNOSIS — Z86.39 HISTORY OF IRON DEFICIENCY: ICD-10-CM

## 2022-02-03 DIAGNOSIS — Z51.81 ENCOUNTER FOR THERAPEUTIC DRUG MONITORING: ICD-10-CM

## 2022-02-03 DIAGNOSIS — Z23 NEED FOR VACCINATION FOR STREP PNEUMONIAE: ICD-10-CM

## 2022-02-03 DIAGNOSIS — Z87.442 HISTORY OF NEPHROLITHIASIS: ICD-10-CM

## 2022-02-03 DIAGNOSIS — Z00.00 ENCOUNTER FOR WELLNESS EXAMINATION IN ADULT: Primary | ICD-10-CM

## 2022-02-03 DIAGNOSIS — E78.00 PURE HYPERCHOLESTEROLEMIA: ICD-10-CM

## 2022-02-03 DIAGNOSIS — I10 ESSENTIAL HYPERTENSION: ICD-10-CM

## 2022-02-03 DIAGNOSIS — N42.9 ABNORMAL PROSTATE ON PHYSICAL EXAMINATION: ICD-10-CM

## 2022-02-03 DIAGNOSIS — Z12.5 SCREENING FOR PROSTATE CANCER: ICD-10-CM

## 2022-02-03 LAB
ALBUMIN SERPL-MCNC: 4.3 G/DL (ref 3.5–5)
ALP SERPL-CCNC: 81 U/L (ref 45–120)
ALT SERPL W P-5'-P-CCNC: 22 U/L (ref 0–45)
ANION GAP SERPL CALCULATED.3IONS-SCNC: 10 MMOL/L (ref 5–18)
AST SERPL W P-5'-P-CCNC: 28 U/L (ref 0–40)
BILIRUB SERPL-MCNC: 0.8 MG/DL (ref 0–1)
BUN SERPL-MCNC: 19 MG/DL (ref 8–22)
CALCIUM SERPL-MCNC: 9.9 MG/DL (ref 8.5–10.5)
CHLORIDE BLD-SCNC: 107 MMOL/L (ref 98–107)
CHOLEST SERPL-MCNC: 175 MG/DL
CK SERPL-CCNC: 93 U/L (ref 30–190)
CO2 SERPL-SCNC: 24 MMOL/L (ref 22–31)
CREAT SERPL-MCNC: 0.83 MG/DL (ref 0.7–1.3)
ERYTHROCYTE [DISTWIDTH] IN BLOOD BY AUTOMATED COUNT: 14.1 % (ref 10–15)
FASTING STATUS PATIENT QL REPORTED: YES
FERRITIN SERPL-MCNC: 33 NG/ML (ref 27–300)
GFR SERPL CREATININE-BSD FRML MDRD: >90 ML/MIN/1.73M2
GLUCOSE BLD-MCNC: 91 MG/DL (ref 70–125)
HCT VFR BLD AUTO: 45.9 % (ref 40–53)
HDLC SERPL-MCNC: 55 MG/DL
HGB BLD-MCNC: 15.7 G/DL (ref 13.3–17.7)
IRON SATN MFR SERPL: 24 % (ref 20–50)
IRON SERPL-MCNC: 92 UG/DL (ref 42–175)
LDLC SERPL CALC-MCNC: 76 MG/DL
MCH RBC QN AUTO: 29.8 PG (ref 26.5–33)
MCHC RBC AUTO-ENTMCNC: 34.2 G/DL (ref 31.5–36.5)
MCV RBC AUTO: 87 FL (ref 78–100)
PLATELET # BLD AUTO: 185 10E3/UL (ref 150–450)
POTASSIUM BLD-SCNC: 4.3 MMOL/L (ref 3.5–5)
PROT SERPL-MCNC: 7.2 G/DL (ref 6–8)
PSA SERPL-MCNC: 2.83 UG/L (ref 0–4.5)
RBC # BLD AUTO: 5.26 10E6/UL (ref 4.4–5.9)
SODIUM SERPL-SCNC: 141 MMOL/L (ref 136–145)
TIBC SERPL-MCNC: 391 UG/DL (ref 313–563)
TRANSFERRIN SERPL-MCNC: 313 MG/DL (ref 212–360)
TRIGL SERPL-MCNC: 220 MG/DL
WBC # BLD AUTO: 6 10E3/UL (ref 4–11)

## 2022-02-03 PROCEDURE — G0438 PPPS, INITIAL VISIT: HCPCS | Performed by: INTERNAL MEDICINE

## 2022-02-03 PROCEDURE — 36415 COLL VENOUS BLD VENIPUNCTURE: CPT | Performed by: INTERNAL MEDICINE

## 2022-02-03 PROCEDURE — 82728 ASSAY OF FERRITIN: CPT | Performed by: INTERNAL MEDICINE

## 2022-02-03 PROCEDURE — 85027 COMPLETE CBC AUTOMATED: CPT | Performed by: INTERNAL MEDICINE

## 2022-02-03 PROCEDURE — 80061 LIPID PANEL: CPT | Performed by: INTERNAL MEDICINE

## 2022-02-03 PROCEDURE — G0103 PSA SCREENING: HCPCS | Performed by: INTERNAL MEDICINE

## 2022-02-03 PROCEDURE — 83540 ASSAY OF IRON: CPT | Performed by: INTERNAL MEDICINE

## 2022-02-03 PROCEDURE — 90732 PPSV23 VACC 2 YRS+ SUBQ/IM: CPT | Performed by: INTERNAL MEDICINE

## 2022-02-03 PROCEDURE — G0009 ADMIN PNEUMOCOCCAL VACCINE: HCPCS | Performed by: INTERNAL MEDICINE

## 2022-02-03 PROCEDURE — 82550 ASSAY OF CK (CPK): CPT | Performed by: INTERNAL MEDICINE

## 2022-02-03 PROCEDURE — 84466 ASSAY OF TRANSFERRIN: CPT | Performed by: INTERNAL MEDICINE

## 2022-02-03 PROCEDURE — 80053 COMPREHEN METABOLIC PANEL: CPT | Performed by: INTERNAL MEDICINE

## 2022-02-03 RX ORDER — SIMVASTATIN 20 MG
20 TABLET ORAL AT BEDTIME
Qty: 90 TABLET | Refills: 3 | Status: SHIPPED | OUTPATIENT
Start: 2022-02-03 | End: 2023-03-24

## 2022-02-03 ASSESSMENT — MIFFLIN-ST. JEOR: SCORE: 1560.21

## 2022-02-03 ASSESSMENT — ACTIVITIES OF DAILY LIVING (ADL): CURRENT_FUNCTION: NO ASSISTANCE NEEDED

## 2022-02-03 NOTE — PATIENT INSTRUCTIONS
Continue with regular exercise as you are doing.    Colonoscopy will be due February 2025.    Continue yearly eye exams in October.    Make an appointment with urology to double check your prostate to see if further evaluation is needed.  You had slight prominence in your right lobe of your prostate.    Follow-up in 6 months for blood pressure checkup appointment.    If you do feel you are having cough or irritated vocal cords from stomach reflux, you can use over-the-counter omeprazole 20 mg a day to see if that helps.

## 2022-02-03 NOTE — PROGRESS NOTES
SUBJECTIVE:   Roberto Belle is a 66 year old male who presents for Preventive Visit.  Living independently with wife.  He had lost 18 pound since he retired, he stays in shape with regular walking on almost daily basis and has been going to the [facility] until the Covid outbreak worsen.  His weight is up 9 pounds from June, however.    No history of diabetes.  Does not drink significant alcohol.    Hypertension has been well controlled, does not check blood pressure on his own.  Continues on lisinopril 2.5 mg a day.    March 2021 cardiac CT scan calcium score 52 in the LAD.  No history of cardiac event.  Remains active with walking and no chest pain or increasing dyspnea on exertion.    He stayed on his simvastatin 20 mg a day.  LDL level 41-year ago.  No new myalgias.  He does not wish to be on aspirin.    He did have a kidney stone with difficult removal last July.  But a follow-up CT scan October 2021 showed resolution of the left stone and left hydronephrosis and no new residual stones.  24-hour urine was okay.  He was given a 1 year follow-up with plan to repeat CT scan this fall.    Minimal atherosclerosis but no AAA seen on the CT scan in October 2021.    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  No new groin pain.    He is never smoked.  No mouth sores or swallowing problems.  No new cough.    He has some mild intermittent reflux laryngitis with mild intermittent hoarse voice.  He is no longer on the PPI but is considering returning to that.  No epigastric pain.    His bowels are normal and no blood in stool or melena.    Patient had iron deficiency anemia in the past with frequent platelet donation.  He is still donating platelets but less frequently.  His hemoglobin level was reported as normal last time he donated.    February 2020 EGD negative.  February 2020 colonoscopy with 2 polyps, 5-year follow-up plan.  June 2021 normal hemoglobin and iron  "levels.    No headache complaints.  No vision changes.  He did see ophthalmology last October with my annual exam.    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  No radicular pain.    Urinating normally.  His PSA was 3.3 last year.  No hematuria.    No falls.    No leg edema.  No new cough    Patient has been advised of split billing requirements and indicates understanding: Yes  Are you in the first 12 months of your Medicare coverage?  No    Healthy Habits:     In general, how would you rate your overall health?  Good    Frequency of exercise:  2-3 days/week    Duration of exercise:  15-30 minutes    Do you usually eat at least 4 servings of fruit and vegetables a day, include whole grains    & fiber and avoid regularly eating high fat or \"junk\" foods?  No    Taking medications regularly:  Yes    Medication side effects:  None    Ability to successfully perform activities of daily living:  No assistance needed    Home Safety:  Lack of grab bars in the bathroom    Hearing Impairment:  No hearing concerns    In the past 6 months, have you been bothered by leaking of urine?  No    In general, how would you rate your overall mental or emotional health?  Excellent      PHQ-2 Total Score: 0    Additional concerns today:  No    Do you feel safe in your environment? Yes    Have you ever done Advance Care Planning? (For example, a Health Directive, POLST, or a discussion with a medical provider or your loved ones about your wishes): Yes, advance care planning is on file.      Fall risk  Fallen 2 or more times in the past year?: No  Any fall with injury in the past year?: No  click delete button to remove this line now  Cognitive Screening   1) Repeat 3 items (Leader, Season, Table)    2) Clock draw: NORMAL  3) 3 item recall: Recalls 3 objects  Results: 3 items recalled: COGNITIVE IMPAIRMENT LESS LIKELY    Mini-CogTM Copyright S " [place]. Licensed by the author for use in [facility]; reprinted with permission (irvin@[facility]). All rights reserved.      Do you have sleep apnea, excessive snoring or daytime drowsiness?: no    Reviewed and updated as needed this visit by clinical staff  Tobacco  Allergies  Meds             Reviewed and updated as needed this visit by Provider    Meds            Social History     Tobacco Use     Smoking status: Never Smoker     Smokeless tobacco: Never Used   Substance Use Topics     Alcohol use: Yes     Alcohol/week: 2.0 standard drinks     Comment: 1-2/week     If you drink alcohol do you typically have >3 drinks per day or >7 drinks per week? No    Alcohol Use 2/3/2022   Prescreen: >3 drinks/day or >7 drinks/week? -   Prescreen: >3 drinks/day or >7 drinks/week? No   No flowsheet data found.      Current providers sharing in care for this patient include:   Patient Care Team:  Sheldon Harvey MD as PCP - General  Sheldon Harvey MD as Assigned PCP  Aneesh Michael MD as Assigned Surgical Provider    The following health maintenance items are reviewed in Epic and correct as of today:  Health Maintenance Due   Topic Date Due     ANNUAL REVIEW OF  ORDERS  Never done     HEPATITIS C SCREENING  Never done     Pneumococcal Vaccine: 65+ Years (2 of 2 - PPSV23) 01/21/2022     Lab work is in process  Patient Active Problem List   Diagnosis     Epididymitis     Hypercholesterolemia     Essential Hypertension     Fatigue     Taking Medication For  A Long Time     Vaccines Prophylactic Needed Against Influenza     Calculus of ureter     Calculus of kidney     Past Surgical History:   Procedure Laterality Date     COMBINED CYSTOSCOPY, INSERT STENT URETER(S) Left 8/6/2021    Procedure: CYSTOURETEROSCOPY, WITH RETROGRADE PYELOGRAM, LASER LITHOTRIPSY, CALCULUS REMOVAL AND URETERAL STENT INSERTION;  Surgeon: Aneesh Michael MD;  Location: [facility]     COMBINED CYSTOSCOPY, RETROGRADES, EXCHANGE  "STENT URETER(S) Left 8/24/2021    Procedure: CYSTOSCOPY, WITH RETROGRADE PYELOGRAM, LASER LITHOTRIPSY, CALCULUS REMOVAL AND URETERAL STENT REPLACEMENT;  Surgeon: Aneesh Michael MD;  Location: [facility] OR     UNDESCENDED TESTICLE EXPLORATION Left 1968       Social History     Tobacco Use     Smoking status: Never Smoker     Smokeless tobacco: Never Used   Substance Use Topics     Alcohol use: Yes     Alcohol/week: 2.0 standard drinks     Comment: 1-2/week     Family History   Problem Relation Age of Onset     Heart Disease Father      Snoring Father      Diabetes Father      Breast Cancer Mother      Other - See Comments Mother         Cancer of the jaw     Macular Degeneration Mother      Diabetes Type 2  Brother          Current Outpatient Medications   Medication Sig Dispense Refill     B complex 11-folic-C-biot-zinc [id number] mg-mg-mcg-mg Tab [B COMPLEX 11-FOLIC-C-BIOT-ZINC [phone] MG-MG-MCG-MG TAB] Take 1 tablet by mouth every other day.        co-enzyme Q-10 30 mg capsule [CO-ENZYME Q-10 30 MG CAPSULE] Take 30 mg by mouth 2 (two) times a day.        lisinopril (ZESTRIL) 2.5 MG tablet Take 1 tablet (2.5 mg) by mouth daily 90 tablet 3     MULTIVITAMIN ORAL [MULTIVITAMIN ORAL] Take 1 tablet by mouth daily.              OMEGA-3/DHA/EPA/FISH OIL (FISH OIL-OMEGA-3 FATTY ACIDS) 300-1,000 mg capsule [OMEGA-3/DHA/EPA/FISH OIL (FISH OIL-OMEGA-3 FATTY ACIDS) 300-1,000 MG CAPSULE] Take 2 g by mouth daily.       simvastatin (ZOCOR) 20 MG tablet Take 1 tablet (20 mg) by mouth At Bedtime 90 tablet 3     No Known Allergies          Review of Systems  Constitutional, HEENT, cardiovascular, pulmonary, GI, , musculoskeletal, neuro, skin, endocrine and psych systems are negative, except as otherwise noted.    OBJECTIVE:   /78 (BP Location: Right arm, Patient Position: Sitting, Cuff Size: Adult Regular)   Pulse 68   Ht 1.695 m (5' 6.75\")   Wt 82.6 kg (182 lb)   SpO2 95%   BMI 28.72 kg/m   Estimated " "body mass index is 28.72 kg/m  as calculated from the following:    Height as of this encounter: 1.695 m (5' 6.75\").    Weight as of this encounter: 82.6 kg (182 lb).  Physical Exam  Alert and oriented x3 with good mood and affect.  Clock face drawing normal and word recall normal.  Mobility exam is normal.  Pupils and irises equal and reactive.  Extraocular muscles intact.  No jaundice or conjunctivitis.  External ears and nose exam is normal.  Normal tympanic membranes.  No cervical or supraclavicular or axillary or inguinal adenopathy.  No JVD and no carotid bruits.  No thyromegaly or nodularity.  Lungs are clear to auscultation with good respiratory excursion.  Heart is regular without murmur rub or gallop.  +2 pedal pulses and no ankle edema.  Feet in good condition.  Abdomen is mildly overweight but nontender.  No hepatosplenomegaly and no pulsatile abdominal mass.   exam shows an atrophic testicle in his left inguinal canal without abnormal mass.  Right testicle is normal.  No inguinal hernia.  Prostate exam does show a moderately enlarged and firm right side lobe of the prostate.  Its not a distinct nodule but it is more asymmetric than description on last year prostate cancer.  He has prominence to the right lobe of the prostate, his last year exam was described symmetric.  Skin exam without suspicious skin lesions    ASSESSMENT / PLAN:   (Z00.00) Encounter for wellness examination in adult  (primary encounter diagnosis)  Comment: Continue regular physical activity which she is doing well with.    Yearly eye exam in October    He confirmed full CODE STATUS wishes  Plan: Full Code        Patient instructions:  Continue with regular exercise as you are doing.    Colonoscopy will be due February 2025.    Continue yearly eye exams in October.    Make an appointment with urology to double check your prostate to see if further evaluation is needed.  You had slight prominence in your right lobe of your " prostate.    Follow-up in 6 months for blood pressure checkup appointment.    If you do feel you are having cough or irritated vocal cords from stomach reflux, you can use over-the-counter omeprazole 20 mg a day to see if that helps.      (I10) Essential hypertension  Comment: Controlled.  Continue lisinopril 2.5 mg a day  Plan:     (E78.00) Hypercholesterolemia  Comment: Positive cardiac CT scan calcium score 52.  Excellent exercise tolerance and no chest pain or event.  Continue on simvastatin 20 mg a day with goal LDL less than 100.  Plan: Lipid Profile     He does not wish to be on aspirin.    (Z87.442) History of nephrolithiasis  Comment: No evidence of recurrence.  Plan: Follow-up with urology in October for CT scan.  Stay well-hydrated    (Z51.81) Encounter for therapeutic drug monitoring  Comment:   Plan: CBC with platelets, Comprehensive metabolic         panel, CK total            (Z12.5) Screening for prostate cancer  Comment:   Plan: Prostate Specific Antigen Screen            (Z86.39) History of iron deficiency  Comment: From previous platelet donation  Plan: Iron binding panel, Ferritin        Not currently taking iron supplement    (N42.9) Abnormal prostate on physical examination  Comment: Prominence of the right lobe of prostate, but not nodule.  I will have urology reevaluate that and determine if further evaluation is needed  Plan: Adult Urology Referral            (Z23) Need for vaccination for Strep pneumoniae  Comment: Given today.  Patient warned of immunization reaction.  Plan: PPSV23, IM/SUBQ (2+ YRS) - [id number]            (E78.00) Pure hypercholesterolemia  Comment: Continue current dose  Plan: simvastatin (ZOCOR) 20 MG tablet    Cryptorchidism left, no new changes in exam    History of reflux laryngitis, can retry over-the-counter omeprazole, otherwise follow-up with ENT if needed              Patient has been advised of split billing requirements and indicates understanding:  "No    COUNSELING:  Reviewed preventive health counseling, as reflected in patient instructions       Regular exercise       Healthy diet/nutrition       Vision screening       Colon cancer screening       Prostate cancer screening    Estimated body mass index is 28.72 kg/m  as calculated from the following:    Height as of this encounter: 1.695 m (5' 6.75\").    Weight as of this encounter: 82.6 kg (182 lb).        He reports that he has never smoked. He has never used smokeless tobacco.      Appropriate preventive services were discussed with this patient, including applicable screening as appropriate for cardiovascular disease, diabetes, osteopenia/osteoporosis, and glaucoma.  As appropriate for age/gender, discussed screening for colorectal cancer, prostate cancer, breast cancer, and cervical cancer. Checklist reviewing preventive services available has been given to the patient.    Reviewed patients plan of care and provided an AVS. The Basic Care Plan (routine screening as documented in Health Maintenance) for Roberto meets the Care Plan requirement. This Care Plan has been established and reviewed with the Patient.    Counseling Resources:  ATP IV Guidelines  Pooled Cohorts Equation Calculator  Breast Cancer Risk Calculator  Breast Cancer: Medication to Reduce Risk  FRAX Risk Assessment  ICSI Preventive Guidelines  Dietary Guidelines for Americans, 2010  USDA's MyPlate  ASA Prophylaxis  Lung CA Screening    Sheldon Harvey MD  [facility]    Identified Health Risks:  "

## 2022-03-07 ENCOUNTER — TRANSFERRED RECORDS (OUTPATIENT)
Dept: HEALTH INFORMATION MANAGEMENT | Facility: CLINIC | Age: 67
End: 2022-03-07
Payer: MEDICARE

## 2022-04-11 ENCOUNTER — TRANSFERRED RECORDS (OUTPATIENT)
Dept: HEALTH INFORMATION MANAGEMENT | Facility: CLINIC | Age: 67
End: 2022-04-11
Payer: MEDICARE

## 2022-08-02 ENCOUNTER — OFFICE VISIT (OUTPATIENT)
Dept: INTERNAL MEDICINE | Facility: CLINIC | Age: 67
End: 2022-08-02
Payer: MEDICARE

## 2022-08-02 VITALS
SYSTOLIC BLOOD PRESSURE: 112 MMHG | DIASTOLIC BLOOD PRESSURE: 74 MMHG | HEART RATE: 65 BPM | OXYGEN SATURATION: 97 % | WEIGHT: 179.2 LBS | BODY MASS INDEX: 28.28 KG/M2

## 2022-08-02 DIAGNOSIS — I10 ESSENTIAL HYPERTENSION: Primary | ICD-10-CM

## 2022-08-02 DIAGNOSIS — E78.00 HYPERCHOLESTEROLEMIA: ICD-10-CM

## 2022-08-02 PROCEDURE — 99213 OFFICE O/P EST LOW 20 MIN: CPT | Performed by: INTERNAL MEDICINE

## 2022-08-02 NOTE — PROGRESS NOTES
Roberto Belle   66 year old male    Date of Visit: 8/2/2022    No chief complaint on file.    Subjective  Hs blood pressure remains well controlled and denies lightheaded dizzy spells.  No lightheaded dizzy spells.  No edema.  No chest pain or palpitations.    On 2.5 mg of lisinopril.    He is working on diet, is lost another 3 pounds.    No chest pain or chest pressure.  March 2021 cardiac CT scan calcium score of 52, no event.  Still on simvastatin 20 mg a day with excellent cholesterol and normal comprehensive metabolic panel in February of this year.    He had a kidney stone removed 1 year ago.  CT scan of the abdomen October of last year negative for recurrent stones and no recurrent renal symptoms.  He plans to repeat the CT scan this October with urology.    Bowels are normal, did have a polyp every 2020 colonoscopy, 5-year follow-up plan.    Patient has no urinary symptoms.  PSA level was 2.8 in February, down from 3.3-year before.  There was somewhat prominent right lobe of the prostate but no nodule.  He did see urology, who confirmed no nodule.  He plans to repeat PSA level in September with urology.    No heartburn symptoms at this time and did not need omeprazole.    He did have a COVID booster in May    PMHx:    Past Medical History:   Diagnosis Date     Arthritis      Calculus of kidney 8/9/2021     Hypertension      PSHx:    Past Surgical History:   Procedure Laterality Date     COMBINED CYSTOSCOPY, INSERT STENT URETER(S) Left 8/6/2021    Procedure: CYSTOURETEROSCOPY, WITH RETROGRADE PYELOGRAM, LASER LITHOTRIPSY, CALCULUS REMOVAL AND URETERAL STENT INSERTION;  Surgeon: Aneesh Michael MD;  Location: [facility] OR     COMBINED CYSTOSCOPY, RETROGRADES, EXCHANGE STENT URETER(S) Left 8/24/2021    Procedure: CYSTOSCOPY, WITH RETROGRADE PYELOGRAM, LASER LITHOTRIPSY, CALCULUS REMOVAL AND URETERAL STENT REPLACEMENT;  Surgeon: Aneesh Michael MD;  [place] [facility] [place]     [facility] [place] Left 1968     Immunizations:   Immunization History   Administered Date(s) Administered     COVID-19,PF,Pfizer (12+ Yrs) 02/24/2021, 03/17/2021, 11/15/2021     FLU 6-35 months 10/13/2009     Flu, Unspecified 10/19/2015, 09/20/2016, 09/18/2017, 09/24/2018, 09/24/2019, 09/22/2020, 09/28/2021     Influenza (IIV3) PF 10/12/2010, 10/11/2011, 10/09/2012, 09/24/2013, 10/23/2014     Influenza vaccine ages 6-35 months 10/19/2015, 09/18/2017, 09/18/2018, 09/24/2019, 09/22/2020     Influenza, Quad, High Dose, Pf, 65yr+ (Fluzone HD) 09/28/2021     Pneumo Conj 13-V (2010&after) 01/21/2021     Pneumococcal 23 valent 02/03/2022     Td (Adult), Adsorbed 12/15/2000     Tdap (Adacel,Boostrix) 11/18/2011, 07/13/2020     Zoster vaccine recombinant adjuvanted (SHINGRIX) 10/18/2019, 01/02/2020       ROS A comprehensive review of systems was performed and was otherwise negative    Medications, allergies, and problem list were reviewed and updated    Exam  /74   Pulse 65   Wt 81.3 kg (179 lb 3.2 oz)   SpO2 97%   BMI 28.28 kg/m    Heart regular without murmur.  No edema.  Appears well.    Assessment/Plan  1. Essential hypertension  Controlled.  Continue current lisinopril 2.5 mg a day.  Monitor for low blood pressure.  Follow-up for physical exam    2. Hypercholesterolemia  Simvastatin 20 mg.  No evidence of toxicity.    Prominent right lobe of the prostate but with normal PSA on last check.  Follow-up with urology in September as planned.    History of nephrolithiasis without evidence of recurrence we will have a follow-up CT scan in October.    History of colon polyp, colonoscopy due February 2025.      Return in about 6 months (around 2/3/2023) for Routine preventive.   Patient Instructions   No change in treatment plan.  Follow-up after February 3 for your adult wellness visit physical exam.    Follow-up with urology as planned.    Get the new COVID vaccine this fall when it comes out.    If you are not  getting blood pressures less than 110/60 or lightheaded dizzy spells, contact me to consider stopping your lisinopril, continue at the current dose at this time.    Shledon Harvey MD, MD        Current Outpatient Medications   Medication Sig Dispense Refill     B complex 11-folic-C-biot-zinc [id number] mg-mg-mcg-mg Tab [B COMPLEX 11-FOLIC-C-BIOT-ZINC [id number] MG-MG-MCG-MG TAB] Take 1 tablet by mouth every other day.        co-enzyme Q-10 30 mg capsule [CO-ENZYME Q-10 30 MG CAPSULE] Take 30 mg by mouth 2 (two) times a day.        lisinopril (ZESTRIL) 2.5 MG tablet Take 1 tablet (2.5 mg) by mouth daily 90 tablet 3     MULTIVITAMIN ORAL [MULTIVITAMIN ORAL] Take 1 tablet by mouth daily.              OMEGA-3/DHA/EPA/FISH OIL (FISH OIL-OMEGA-3 FATTY ACIDS) 300-1,000 mg capsule [OMEGA-3/DHA/EPA/FISH OIL (FISH OIL-OMEGA-3 FATTY ACIDS) 300-1,000 MG CAPSULE] Take 2 g by mouth daily.       simvastatin (ZOCOR) 20 MG tablet Take 1 tablet (20 mg) by mouth At Bedtime 90 tablet 3     No Known Allergies  Social History     Tobacco Use     Smoking status: Never Smoker     Smokeless tobacco: Never Used   Substance Use Topics     Alcohol use: Yes     Alcohol/week: 2.0 standard drinks     Comment: 1-2/week     Drug use: Not Currently             Subjective   Roberto is a 66 year old, presenting for the following health issues:  No chief complaint on file.      History of Present Illness       Hypertension: He presents for follow up of hypertension.  He does check blood pressure  regularly outside of the clinic. Outpatient blood pressures have not been over 140/90. He does not follow a low salt diet.               Review of Systems         Objective    There were no vitals taken for this visit.  There is no height or weight on file to calculate BMI.  Physical Exam                       .  ..

## 2022-08-02 NOTE — PATIENT INSTRUCTIONS
No change in treatment plan.  Follow-up after February 3 for your adult wellness visit physical exam.    Follow-up with urology as planned.    Get the new COVID vaccine this fall when it comes out.    If you are not getting blood pressures less than 110/60 or lightheaded dizzy spells, contact me to consider stopping your lisinopril, continue at the current dose at this time.

## 2022-08-30 ENCOUNTER — DOCUMENTATION ONLY (OUTPATIENT)
Dept: LAB | Facility: CLINIC | Age: 67
End: 2022-08-30

## 2022-08-30 DIAGNOSIS — R97.20 ELEVATED PROSTATE SPECIFIC ANTIGEN (PSA): Primary | ICD-10-CM

## 2022-09-02 ENCOUNTER — LAB (OUTPATIENT)
Dept: LAB | Facility: CLINIC | Age: 67
End: 2022-09-02
Payer: MEDICARE

## 2022-09-02 DIAGNOSIS — R97.20 ELEVATED PROSTATE SPECIFIC ANTIGEN (PSA): ICD-10-CM

## 2022-09-02 LAB — PSA SERPL-MCNC: 2.83 NG/ML (ref 0–4.5)

## 2022-09-02 PROCEDURE — 36415 COLL VENOUS BLD VENIPUNCTURE: CPT

## 2022-09-02 PROCEDURE — 84153 ASSAY OF PSA TOTAL: CPT

## 2022-09-09 ENCOUNTER — TRANSFERRED RECORDS (OUTPATIENT)
Dept: HEALTH INFORMATION MANAGEMENT | Facility: CLINIC | Age: 67
End: 2022-09-09

## 2022-11-04 ENCOUNTER — HOSPITAL ENCOUNTER (OUTPATIENT)
Dept: CT IMAGING | Facility: CLINIC | Age: 67
Discharge: HOME | End: 2022-11-04
Attending: UROLOGY | Admitting: UROLOGY
Payer: MEDICARE

## 2022-11-04 DIAGNOSIS — N20.0 CALCULUS OF KIDNEY: ICD-10-CM

## 2022-11-04 PROCEDURE — 74176 CT ABD & PELVIS W/O CONTRAST: CPT

## 2022-12-08 ENCOUNTER — TELEPHONE (OUTPATIENT)
Dept: UROLOGY | Facility: CLINIC | Age: 67
End: 2022-12-08

## 2022-12-08 NOTE — TELEPHONE ENCOUNTER
[facility] Call Center    Phone Message    May a detailed message be left on voicemail: yes     Reason for Call: Patient called stating he received a letter that it's time for him to f/u with Dr. Michael. Patient states he had a CT done 11/04 and everything was clear. He wants to know if this appointment is needed. Please contact patient in regards to this message. States he can be contacted by phone or via [employer]. Thank you    Action Taken: Other: [facility]    Travel Screening: Not Applicable

## 2022-12-15 ENCOUNTER — VIRTUAL VISIT (OUTPATIENT)
Dept: UROLOGY | Facility: CLINIC | Age: 67
End: 2022-12-15
Payer: MEDICARE

## 2022-12-15 DIAGNOSIS — N20.0 CALCULUS OF KIDNEY: Primary | ICD-10-CM

## 2022-12-15 PROCEDURE — 99213 OFFICE O/P EST LOW 20 MIN: CPT | Mod: 95 | Performed by: UROLOGY

## 2022-12-15 RX ORDER — UBIDECARENONE 100 MG
100 CAPSULE ORAL 2 TIMES DAILY
COMMUNITY

## 2022-12-15 ASSESSMENT — PAIN SCALES - GENERAL: PAINLEVEL: NO PAIN (0)

## 2022-12-15 NOTE — PROGRESS NOTES
Patient is roomed via telephone for a virtual visit.  Patient confirmed he is in the [facility] at the time of this appointment.  Patient understand that this visit is billable and agree to proceed with appointment.

## 2022-12-15 NOTE — PROGRESS NOTES
Assessment/Plan:    Assessment & Plan   Roberto was seen today for long term.    Diagnoses and all orders for this visit:    Calculus of kidney  -     Patient Stated Goal: Prevent further stones        Stone Management Plan  Stone Management 10/26/2021 11/23/2021 12/15/2022   Urinary Tract Infection No suspicion of infection No suspicion of infection No suspicion of infection   Renal Colic Asymptomatic at this time Asymptomatic at this time Asymptomatic at this time   Renal Failure No suspicion of renal failure No suspicion of renal failure No suspicion of renal failure   Current CT date 10/26/2021 - 12/11/2022   Right sided stones? No - No   R Stone Event No current event No current event No current event   Left sided stones? No - No   L Number of ureteral stones - - -   L Number of kidney stones  - - -   L GSD of kidney stones - - -   L Hydronephrosis - - -   L Stone Event Resolved event No current event No current event   Diagnosis date - - -   Initial location of primary symptomatic stone - - -   Initial GSD of primary symptomatic stone - - -   Resolved date 10/26/2021 - -   Post-op status No residual stone - -   L Current Plan - - -   Clear rationale - - -             PLAN    Video call duration: 7 minutes  Distant site (provider site): Remote  12 minutes spent on the date of the encounter doing chart review, history and exam, documentation and further activities per the note    CAROLINA LOUISE MD  [facility]    HPI  Mr. Roberto Belle is a 67 year old  male who is being evaluated via a billable video visit by [facility] [place] for follow up of his stone disease.    He initially presented with a very large stone burden trapped in an obstructed calyx. He has been fine since that was cleared.    CT continues to demonstrate that he is stone free.    He is most pleased with this observation and is omfortable transitioning to PRN follow up.    ROS    Review of systems is negative except for HPI.    Past Medical History:   Diagnosis Date     Arthritis      Calculus of kidney 8/9/2021     Hypertension        Past Surgical History:   Procedure Laterality Date     COMBINED CYSTOSCOPY, INSERT STENT URETER(S) Left 8/6/2021    Procedure: CYSTOURETEROSCOPY, WITH RETROGRADE PYELOGRAM, LASER LITHOTRIPSY, CALCULUS REMOVAL AND URETERAL STENT INSERTION;  Surgeon: Aneesh Michael MD;  Location: [facility] OR     COMBINED CYSTOSCOPY, RETROGRADES, EXCHANGE STENT URETER(S) Left 8/24/2021    Procedure: CYSTOSCOPY, WITH RETROGRADE PYELOGRAM, LASER LITHOTRIPSY, CALCULUS REMOVAL AND URETERAL STENT REPLACEMENT;  Surgeon: Aneesh Michael MD;  Location: [facility] OR     UNDESCENDED TESTICLE EXPLORATION Left 1968       Current Outpatient Medications   Medication Sig Dispense Refill     B complex 11-folic-C-biot-zinc [id number] mg-mg-mcg-mg Tab [B COMPLEX 11-FOLIC-C-BIOT-ZINC [id number] MG-MG-MCG-MG TAB] Take 1 tablet by mouth every other day.        co-enzyme Q-10 100 MG CAPS capsule Take 100 mg by mouth 2 times daily       lisinopril (ZESTRIL) 2.5 MG tablet Take 1 tablet (2.5 mg) by mouth daily 90 tablet 3     MULTIVITAMIN ORAL [MULTIVITAMIN ORAL] Take 1 tablet by mouth daily.              OMEGA-3/DHA/EPA/FISH OIL (FISH OIL-OMEGA-3 FATTY ACIDS) 300-1,000 mg capsule [OMEGA-3/DHA/EPA/FISH OIL (FISH OIL-OMEGA-3 FATTY ACIDS) 300-1,000 MG CAPSULE] Take 2 g by mouth daily.       simvastatin (ZOCOR) 20 MG tablet Take 1 tablet (20 mg) by mouth At Bedtime 90 tablet 3       No Known Allergies    Social History     Socioeconomic History     Marital status:      Spouse name: Not on file     Number of children: 2     Years of education: Not on file     Highest education level: Not on file   Occupational History     Not on file   Tobacco Use     Smoking status: Never     Smokeless tobacco: Never   Substance and Sexual Activity     Alcohol use: Yes     Alcohol/week: 2.0  standard drinks     Comment: 1-2/week     Drug use: Not Currently     Sexual activity: Not on file   Other Topics Concern     Not on file   Social History Narrative     Not on file     Social Determinants of Health     Financial Resource Strain: Not on file   Food Insecurity: Not on file   Transportation Needs: Not on file   Physical Activity: Not on file   Stress: Not on file   Social Connections: Not on file   Intimate Partner Violence: Not on file   Housing Stability: Not on file       Family History   Problem Relation Age of Onset     Heart Disease Father      Snoring Father      Diabetes Father      Breast Cancer Mother      Other - See Comments Mother         Cancer of the jaw     Macular Degeneration Mother      Diabetes Type 2  Brother        Objective:     Appears AAO x 3  No vitals obtained due to virtual visit    Labs   Most Recent 3 CBC's:Recent Labs   Lab Test 02/03/22  1151 06/22/21  1202 01/21/21  1603   WBC 6.0 6.3 7.0   HGB 15.7 15.7 16.6   MCV 87 88 93    170 186     Most Recent 3 BMP's:Recent Labs   Lab Test 02/03/22  1151 11/19/21  0947 06/22/21  1202    143 142   POTASSIUM 4.3 4.1 4.5   CHLORIDE 107 107 104   CO2 24 27 25   BUN 19 18 23*   CR 0.83 0.92 0.83   ANIONGAP 10 9 13   MICHELLE 9.9 10.0 10.2   GLC 91 94 89     Most Recent Urinalysis:No lab results found.  Acute Labs Urine Culture  No results found for: CULTURE

## 2023-01-02 DIAGNOSIS — I10 ESSENTIAL HYPERTENSION: ICD-10-CM

## 2023-01-02 RX ORDER — LISINOPRIL 2.5 MG/1
2.5 TABLET ORAL DAILY
Qty: 90 TABLET | Refills: 1 | Status: SHIPPED | OUTPATIENT
Start: 2023-01-02 | End: 2023-07-18

## 2023-01-02 NOTE — TELEPHONE ENCOUNTER
"Last Written Prescription Date:  1/11/22  Last Fill Quantity: 90,  # refills: 3   Last office visit provider:  8/2/22     Requested Prescriptions   Pending Prescriptions Disp Refills     lisinopril (ZESTRIL) 2.5 MG tablet 90 tablet 3     Sig: Take 1 tablet (2.5 mg) by mouth daily       ACE Inhibitors (Including Combos) Protocol Passed - 1/2/2023 10:41 AM        Passed - Blood pressure under 140/90 in past 12 months     BP Readings from Last 3 Encounters:   08/02/22 112/74   02/03/22 114/78   08/24/21 127/74                 Passed - Recent (12 mo) or future (30 days) visit within the authorizing provider's specialty     Patient has had an office visit with the authorizing provider or a provider within the authorizing providers department within the previous 12 mos or has a future within next 30 days. See \"Patient Info\" tab in inbasket, or \"Choose Columns\" in Meds & Orders section of the refill encounter.              Passed - Medication is active on med list        Passed - Patient is age 18 or older        Passed - Normal serum creatinine on file in past 12 months     Recent Labs   Lab Test 02/03/22  1151   CR 0.83       Ok to refill medication if creatinine is low          Passed - Normal serum potassium on file in past 12 months     Recent Labs   Lab Test 02/03/22  1151   POTASSIUM 4.3                  Connie Ortega RN 01/02/23 5:47 PM  "

## 2023-01-31 ASSESSMENT — ENCOUNTER SYMPTOMS
ARTHRALGIAS: 0
HEMATURIA: 0
CONSTIPATION: 0
ABDOMINAL PAIN: 0
DYSURIA: 0
DIARRHEA: 0
SHORTNESS OF BREATH: 0
WEAKNESS: 0
HEMATOCHEZIA: 0
PARESTHESIAS: 0
DIZZINESS: 0
MYALGIAS: 0
HEARTBURN: 0
FEVER: 0
FREQUENCY: 0
NERVOUS/ANXIOUS: 0
SORE THROAT: 0
EYE PAIN: 0
PALPITATIONS: 0
COUGH: 0
NAUSEA: 0
JOINT SWELLING: 0
CHILLS: 0
HEADACHES: 0

## 2023-01-31 ASSESSMENT — ACTIVITIES OF DAILY LIVING (ADL): CURRENT_FUNCTION: NO ASSISTANCE NEEDED

## 2023-02-06 ENCOUNTER — OFFICE VISIT (OUTPATIENT)
Dept: INTERNAL MEDICINE | Facility: CLINIC | Age: 68
End: 2023-02-06
Payer: MEDICARE

## 2023-02-06 VITALS
BODY MASS INDEX: 28.77 KG/M2 | DIASTOLIC BLOOD PRESSURE: 72 MMHG | RESPIRATION RATE: 16 BRPM | TEMPERATURE: 98.2 F | OXYGEN SATURATION: 99 % | HEART RATE: 68 BPM | HEIGHT: 66 IN | WEIGHT: 179 LBS | SYSTOLIC BLOOD PRESSURE: 118 MMHG

## 2023-02-06 DIAGNOSIS — Z86.0100 HISTORY OF COLONIC POLYPS: ICD-10-CM

## 2023-02-06 DIAGNOSIS — Z51.81 ENCOUNTER FOR THERAPEUTIC DRUG MONITORING: ICD-10-CM

## 2023-02-06 DIAGNOSIS — L98.9 SKIN LESION: ICD-10-CM

## 2023-02-06 DIAGNOSIS — Z12.5 SCREENING FOR PROSTATE CANCER: ICD-10-CM

## 2023-02-06 DIAGNOSIS — I10 ESSENTIAL HYPERTENSION: ICD-10-CM

## 2023-02-06 DIAGNOSIS — E78.00 HYPERCHOLESTEROLEMIA: ICD-10-CM

## 2023-02-06 DIAGNOSIS — Z00.00 ENCOUNTER FOR WELLNESS EXAMINATION IN ADULT: Primary | ICD-10-CM

## 2023-02-06 LAB
ALBUMIN SERPL BCG-MCNC: 4.7 G/DL (ref 3.5–5.2)
ALP SERPL-CCNC: 84 U/L (ref 40–129)
ALT SERPL W P-5'-P-CCNC: 20 U/L (ref 10–50)
ANION GAP SERPL CALCULATED.3IONS-SCNC: 12 MMOL/L (ref 7–15)
AST SERPL W P-5'-P-CCNC: 29 U/L (ref 10–50)
BILIRUB SERPL-MCNC: 0.7 MG/DL
BUN SERPL-MCNC: 24.8 MG/DL (ref 8–23)
CALCIUM SERPL-MCNC: 9.6 MG/DL (ref 8.8–10.2)
CHLORIDE SERPL-SCNC: 106 MMOL/L (ref 98–107)
CHOLEST SERPL-MCNC: 159 MG/DL
CREAT SERPL-MCNC: 0.89 MG/DL (ref 0.67–1.17)
DEPRECATED HCO3 PLAS-SCNC: 25 MMOL/L (ref 22–29)
ERYTHROCYTE [DISTWIDTH] IN BLOOD BY AUTOMATED COUNT: 14.3 % (ref 10–15)
GFR SERPL CREATININE-BSD FRML MDRD: >90 ML/MIN/1.73M2
GLUCOSE SERPL-MCNC: 101 MG/DL (ref 70–99)
HCT VFR BLD AUTO: 45.3 % (ref 40–53)
HDLC SERPL-MCNC: 61 MG/DL
HGB BLD-MCNC: 15.6 G/DL (ref 13.3–17.7)
LDLC SERPL CALC-MCNC: 66 MG/DL
MCH RBC QN AUTO: 30.3 PG (ref 26.5–33)
MCHC RBC AUTO-ENTMCNC: 34.4 G/DL (ref 31.5–36.5)
MCV RBC AUTO: 88 FL (ref 78–100)
NONHDLC SERPL-MCNC: 98 MG/DL
PLATELET # BLD AUTO: 179 10E3/UL (ref 150–450)
POTASSIUM SERPL-SCNC: 4.3 MMOL/L (ref 3.4–5.3)
PROT SERPL-MCNC: 7.2 G/DL (ref 6.4–8.3)
PSA SERPL-MCNC: 2.49 NG/ML (ref 0–4.5)
RBC # BLD AUTO: 5.15 10E6/UL (ref 4.4–5.9)
SODIUM SERPL-SCNC: 143 MMOL/L (ref 136–145)
TRIGL SERPL-MCNC: 159 MG/DL
WBC # BLD AUTO: 5.6 10E3/UL (ref 4–11)

## 2023-02-06 PROCEDURE — G0439 PPPS, SUBSEQ VISIT: HCPCS | Performed by: INTERNAL MEDICINE

## 2023-02-06 PROCEDURE — 80053 COMPREHEN METABOLIC PANEL: CPT | Performed by: INTERNAL MEDICINE

## 2023-02-06 PROCEDURE — 85027 COMPLETE CBC AUTOMATED: CPT | Performed by: INTERNAL MEDICINE

## 2023-02-06 PROCEDURE — 99213 OFFICE O/P EST LOW 20 MIN: CPT | Mod: 25 | Performed by: INTERNAL MEDICINE

## 2023-02-06 PROCEDURE — 36415 COLL VENOUS BLD VENIPUNCTURE: CPT | Performed by: INTERNAL MEDICINE

## 2023-02-06 PROCEDURE — G0103 PSA SCREENING: HCPCS | Performed by: INTERNAL MEDICINE

## 2023-02-06 PROCEDURE — 80061 LIPID PANEL: CPT | Performed by: INTERNAL MEDICINE

## 2023-02-06 ASSESSMENT — ACTIVITIES OF DAILY LIVING (ADL): CURRENT_FUNCTION: NO ASSISTANCE NEEDED

## 2023-02-06 ASSESSMENT — ENCOUNTER SYMPTOMS
MYALGIAS: 0
NERVOUS/ANXIOUS: 0
FEVER: 0
EYE PAIN: 0
ARTHRALGIAS: 0
ABDOMINAL PAIN: 0
HEADACHES: 0
DIZZINESS: 0
PALPITATIONS: 0
DIARRHEA: 0
PARESTHESIAS: 0
WEAKNESS: 0
CHILLS: 0
FREQUENCY: 0
NAUSEA: 0
HEARTBURN: 0
COUGH: 0
HEMATOCHEZIA: 0
SHORTNESS OF BREATH: 0
HEMATURIA: 0
CONSTIPATION: 0
SORE THROAT: 0
DYSURIA: 0
JOINT SWELLING: 0

## 2023-02-06 NOTE — PATIENT INSTRUCTIONS
No change in treatment plan.    Goal blood pressure less than 135/85, but not less than 110/60.    You can see urology 1 more time this summer, but if your exam and PSA is stable, I would recommend returning to yearly screening prostate checks with me in 1 year.    Your colonoscopy is due February 2025.    The right chest nodule is most likely a seborrheic keratosis which is benign, but you should see dermatology for full evaluation of that.  If there is changes or growth in that mole before April dermatology appointment, get a sooner appointment.    See me in 1 year for physical exam.

## 2023-02-06 NOTE — PROGRESS NOTES
SUBJECTIVE:   Roberto is a 67 year old who presents for Preventive Visit.    Independently with wife.  He continues to have an active lifestyle since his long-term.  He plays pickle ball about 4 times a week, goes to the gym regularly.  Good exertional ability.  No musculoskeletal pain that affects activity.    No chest pain or increasing shortness of breath or edema.    March 2021 cardiac CT scan calcium score of 52, no event.    On simvastatin 20 mg a day.  No generalized myalgias.  No history of liver problems.    Hypertension controlled on lisinopril 2.5 mg a day.  He has not checked his blood pressure regularly but controlled when he does check it.  Denies orthostasis.    Denies daytime sleepiness or sleep apnea symptoms.    Does not drink significant alcohol.    History of mild high triglycerides.  Otherwise diet is quite good.    Never smoked.    He has a chronic postnasal drip type cough.  He did take 2 weeks of omeprazole and it did help some.  He is using Flonase now to see if that helps.    He did have COVID last September and then last December as well.  He did get the recent vaccine last month.    Minimal nonproductive postnasal drip dry cough currently.  No increasing shortness of breath or wheezing.    No kidney stone symptoms.  No urinary symptoms.  No significant nocturia.    CT scan November 2022 was negative for kidney stone.  No AAA.    He had a negative EGD for Fraser's in 2020.    Colonoscopy February 2020 did show polyps with 5-year follow-up.  Bowels are regular with Metamucil.  No blood in stool or abdominal pain.    Patient had an elevated PSA earlier but was back to normal at 2.8 last September.  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    He strained his back picking up a ball on the pickleball court last December but went to a chiropractor and got some back exercises and does better with  "that.    Patient noticed a chest nodule on his right chest recently, has not grown.  No history of skin cancer, does have a dermatology appointment in April    Patient has been advised of split billing requirements and indicates understanding: Yes  Are you in the first 12 months of your Medicare coverage?  No    Healthy Habits:     In general, how would you rate your overall health?  Good    Frequency of exercise:  4-5 days/week    Duration of exercise:  30-45 minutes    Do you usually eat at least 4 servings of fruit and vegetables a day, include whole grains    & fiber and avoid regularly eating high fat or \"junk\" foods?  No    Taking medications regularly:  Yes    Medication side effects:  None    Ability to successfully perform activities of daily living:  No assistance needed    Home Safety:  Lack of grab bars in the bathroom    Hearing Impairment:  No hearing concerns    In the past 6 months, have you been bothered by leaking of urine?  No    In general, how would you rate your overall mental or emotional health?  Good      PHQ-2 Total Score: 0    Additional concerns today:  No      Have you ever done Advance Care Planning? (For example, a Health Directive, POLST, or a discussion with a medical provider or your loved ones about your wishes): Yes, patient states has an Advance Care Planning document and will bring a copy to the clinic.      Fall risk  Fallen 2 or more times in the past year?: No  Any fall with injury in the past year?: No  click delete button to remove this line now  Cognitive Screening   1) Repeat 3 items (Leader, Season, Table)    2) Clock draw: NORMAL  3) 3 item recall: Recalls 3 objects  Results: 3 items recalled: COGNITIVE IMPAIRMENT LESS LIKELY    Mini-CogTM Copyright ARNOLD Cheatham. Licensed by the author for use in [facility]; reprinted with permission (irvin@.[facility]). All rights reserved.      Do you have sleep apnea, excessive snoring or daytime drowsiness?: no    Reviewed and " updated as needed this visit by clinical staff   Tobacco  Allergies  Meds              Reviewed and updated as needed this visit by Provider                 Social History     Tobacco Use     Smoking status: Never     Smokeless tobacco: Never   Substance Use Topics     Alcohol use: Yes     Alcohol/week: 2.0 standard drinks     Comment: 1-2/week     If you drink alcohol do you typically have >3 drinks per day or >7 drinks per week? No    Alcohol Use 1/31/2023   Prescreen: >3 drinks/day or >7 drinks/week? No   Prescreen: >3 drinks/day or >7 drinks/week? -   No flowsheet data found.          Current providers sharing in care for this patient include:   Patient Care Team:  Sheldon Harvey MD as PCP - General  Sheldon Harvey MD as Assigned PCP  Aneesh Michael MD as Assigned Surgical Provider    The following health maintenance items are reviewed in Epic and correct as of today:  Health Maintenance   Topic Date Due     ANNUAL REVIEW OF HM ORDERS  Never done     HEPATITIS C SCREENING  Never done     MEDICARE ANNUAL WELLNESS VISIT  02/03/2023     FALL RISK ASSESSMENT  02/06/2024     COLORECTAL CANCER SCREENING  01/22/2025     LIPID  02/03/2027     ADVANCE CARE PLANNING  02/06/2028     DTAP/TDAP/TD IMMUNIZATION (3 - Td or Tdap) 07/13/2030     PHQ-2 (once per calendar year)  Completed     INFLUENZA VACCINE  Completed     Pneumococcal Vaccine: 65+ Years  Completed     ZOSTER IMMUNIZATION  Completed     AORTIC ANEURYSM SCREENING (SYSTEM ASSIGNED)  Completed     COVID-19 Vaccine  Completed     IPV IMMUNIZATION  Aged Out     MENINGITIS IMMUNIZATION  Aged Out     Current Outpatient Medications   Medication Sig Dispense Refill     B complex 11-folic-C-biot-zinc 1-295-946-50 mg-mg-mcg-mg Tab [B COMPLEX 11-FOLIC-C-BIOT-ZINC 7-274-199-50 MG-MG-MCG-MG TAB] Take 1 tablet by mouth every other day.        co-enzyme Q-10 100 MG CAPS capsule Take 100 mg by mouth 2 times daily       lisinopril (ZESTRIL) 2.5 MG tablet Take 1 tablet  "(2.5 mg) by mouth daily 90 tablet 1     MULTIVITAMIN ORAL [MULTIVITAMIN ORAL] Take 1 tablet by mouth daily.              OMEGA-3/DHA/EPA/FISH OIL (FISH OIL-OMEGA-3 FATTY ACIDS) 300-1,000 mg capsule [OMEGA-3/DHA/EPA/FISH OIL (FISH OIL-OMEGA-3 FATTY ACIDS) 300-1,000 MG CAPSULE] Take 2 g by mouth daily.       simvastatin (ZOCOR) 20 MG tablet Take 1 tablet (20 mg) by mouth At Bedtime 90 tablet 3     No Known Allergies      Review of Systems   Constitutional: Negative for chills and fever.   HENT: Negative for congestion, ear pain, hearing loss and sore throat.    Eyes: Negative for pain and visual disturbance.   Respiratory: Negative for cough and shortness of breath.    Cardiovascular: Negative for chest pain, palpitations and peripheral edema.   Gastrointestinal: Negative for abdominal pain, constipation, diarrhea, heartburn, hematochezia and nausea.   Genitourinary: Negative for dysuria, frequency, genital sores, hematuria, impotence, penile discharge and urgency.   Musculoskeletal: Negative for arthralgias, joint swelling and myalgias.   Skin: Negative for rash.   Neurological: Negative for dizziness, weakness, headaches and paresthesias.   Psychiatric/Behavioral: Negative for mood changes. The patient is not nervous/anxious.      Review of systems is negative.    OBJECTIVE:   /72   Pulse 68   Temp 98.2  F (36.8  C) (Oral)   Resp 16   Ht 1.683 m (5' 6.25\")   Wt 81.2 kg (179 lb)   SpO2 99%   BMI 28.67 kg/m   Estimated body mass index is 28.67 kg/m  as calculated from the following:    Height as of this encounter: 1.683 m (5' 6.25\").    Weight as of this encounter: 81.2 kg (179 lb).  Physical Exam  Healthy appearing male.  In good physical condition.  Normal mentation.  Normal clock face drawing and word recall.  Normal mobility.  Pupils and irises equal and reactive.  Extraocular muscles intact.  No jaundice or conjunctivitis.  External ears and nose exam was normal.  No significant cerumen and tympanic " membranes are normal.  Pharynx is normal, just a mild postnasal drip type pharyngitis bilaterally.  Pharynx is not crowded.  Minimal neck adiposity.  Teeth in good condition.  No cervical or supraclavicular or axillary adenopathy.  No JVD and no carotid bruits.  No thyromegaly or nodularity.  Lungs clear to auscultation with good respiratory excursion.  Heart is regular with no murmur rub or gallop.  +2 pedal pulses and no ankle edema.  Feet in good condition.  Abdomen is nonobese nontender no hepatosplenomegaly and no pulsatile abdominal mass.  Skin exam does show a 1 cm x 0.5 cm oval well demarcated dermal plaque on his right chest wall, suggestive of a seborrheic keratosis without any pigment.  It does look well organized and flat.  No ulceration.  No other suspicious skin lesions noted.  He declined anterior  exam.  Prostate exam shows a smooth right prostate and a hypoplastic left lobe of the prostate.  But there is no nodule.    ASSESSMENT / PLAN:   (Z00.00) Encounter for wellness examination in adult  (primary encounter diagnosis)  Comment: Patient continues with excellent regular exercise and diet.  He is kept his weight down.    Moderate cardiovascular risk profile with positive cardiac CT and cholesterol and blood pressure, but those are well controlled.  Patient remains asymptomatic with good exercise.    Full CODE STATUS.  Plan: Up-to-date on immunizations.    (Z12.5) Screening for prostate cancer  Comment: Check PSA today, return to yearly screening  Plan: Prostate Specific Antigen Screen        Hypoplastic left lobe of the prostate    (Z86.010) History of colonic polyps  Comment: Polyp February 2020, 5-year follow-up plan  Plan:     (E78.00) Hypercholesterolemia  Comment: Goal LDL less than 130.  Tolerating simvastatin 20 mg, continue current dose  Plan: Lipid Profile            (I10) Essential hypertension  Comment: Well-controlled on low-dose lisinopril 2.5 mg a day  Plan:    I suspect his cough is  more of an irritative cough, some benefit from omeprazole which he could continue if needed.  He is using Flonase now and does have some element of postnasal drip.  He also had COVID in December and there may be some residual cough.  Follow-up if the cough becomes more of an issue.    (Z51.81) Encounter for therapeutic drug monitoring  Comment:   Plan: Comprehensive metabolic panel, CBC with         platelets            Skin lesion, appears to be a seborrheic keratosis, the patient was told I could not be certain that it was benign and he is to still see dermatology, sooner than April with any changes      Patient has been advised of split billing requirements and indicates understanding: Yes      COUNSELING:  Reviewed preventive health counseling, as reflected in patient instructions       Regular exercise       Healthy diet/nutrition       Colon cancer screening       Prostate cancer screening        He reports that he has never smoked. He has never used smokeless tobacco.      Appropriate preventive services were discussed with this patient, including applicable screening as appropriate for cardiovascular disease, diabetes, osteopenia/osteoporosis, and glaucoma.  As appropriate for age/gender, discussed screening for colorectal cancer, prostate cancer, breast cancer, and cervical cancer. Checklist reviewing preventive services available has been given to the patient.    Reviewed patients plan of care and provided an AVS. The Basic Care Plan (routine screening as documented in Health Maintenance) for [place] meets the Care Plan requirement. This Care Plan has been established and reviewed with the Patient.          Sheldon Harvey MD  [employer]    Identified Health Risks:

## 2023-03-23 DIAGNOSIS — E78.00 PURE HYPERCHOLESTEROLEMIA: ICD-10-CM

## 2023-03-24 RX ORDER — SIMVASTATIN 20 MG
20 TABLET ORAL AT BEDTIME
Qty: 90 TABLET | Refills: 3 | Status: SHIPPED | OUTPATIENT
Start: 2023-03-24 | End: 2024-03-04

## 2023-03-24 NOTE — TELEPHONE ENCOUNTER
"Last Written Prescription Date:  2/3/2022  Last Fill Quantity: 90,  # refills: 3   Last office visit provider:  2/6/2023     Requested Prescriptions   Pending Prescriptions Disp Refills     simvastatin (ZOCOR) 20 MG tablet 90 tablet 3     Sig: Take 1 tablet (20 mg) by mouth At Bedtime       Statins Protocol Passed - 3/24/2023  6:12 AM        Passed - LDL on file in past 12 months     Recent Labs   Lab Test 02/06/23  1006   LDL 66             Passed - No abnormal creatine kinase in past 12 months     Recent Labs   Lab Test 02/03/22  1151   CKT 93                Passed - Recent (12 mo) or future (30 days) visit within the authorizing provider's specialty     Patient has had an office visit with the authorizing provider or a provider within the authorizing providers department within the previous 12 mos or has a future within next 30 days. See \"Patient Info\" tab in inbasket, or \"Choose Columns\" in Meds & Orders section of the refill encounter.              Passed - Medication is active on med list        Passed - Patient is age 18 or older             Bethany Romero RN 03/24/23 6:12 AM  "

## 2023-04-12 ENCOUNTER — TRANSFERRED RECORDS (OUTPATIENT)
Dept: HEALTH INFORMATION MANAGEMENT | Facility: CLINIC | Age: 68
End: 2023-04-12
Payer: MEDICARE

## 2023-05-30 ENCOUNTER — OFFICE VISIT (OUTPATIENT)
Dept: PODIATRY | Facility: CLINIC | Age: 68
End: 2023-05-30
Payer: MEDICARE

## 2023-05-30 VITALS — BODY MASS INDEX: 29.19 KG/M2 | HEART RATE: 64 BPM | WEIGHT: 182.2 LBS | TEMPERATURE: 98.1 F

## 2023-05-30 DIAGNOSIS — M77.52 CAPSULITIS OF METATARSOPHALANGEAL (MTP) JOINT OF LEFT FOOT: ICD-10-CM

## 2023-05-30 DIAGNOSIS — M20.5X2 HALLUX LIMITUS OF LEFT FOOT: ICD-10-CM

## 2023-05-30 DIAGNOSIS — M20.12 HALLUX ABDUCTO VALGUS, LEFT: Primary | ICD-10-CM

## 2023-05-30 PROCEDURE — 99203 OFFICE O/P NEW LOW 30 MIN: CPT | Performed by: PODIATRIST

## 2023-05-30 NOTE — PROGRESS NOTES
FOOT AND ANKLE SURGERY/PODIATRY CONSULT NOTE         ASSESSMENT:   Hallux abductovalgus left  Hallux limitus left  Capsulitis first MPJ left foot      TREATMENT:  -I discussed with the patient that he does have a very mild bunion on the left foot with associated degenerative changes and limited and dorsiflexion.    -Based on the current presentation we reviewed that he is not a surgical candidate.  Previous symptoms of discomfort were likely related to inflammation of the joint in the form of capsulitis.    -Reviewed treatment options including anti-inflammatory medication and avoiding barefoot walking if symptoms return.    -Discussed referral for new orthotics, patient declines.    -Patient's questions invited and answered.  He was encouraged to call my office with any further questions or concerns.     Barney Shukla DPM  [facility] Podiatry/Foot & Ankle Surgery        HPI: I was asked to see Roberto Belle today for concerns related to a bunion on his left foot.  Patient denies pain but states he has noticed that the left foot has a bunion as compared to the right foot which does not.  He has used orthotics in the past but denies any pain at this time.    Past Medical History:   Diagnosis Date     Arthritis      Calculus of kidney 8/9/2021     Hypertension          Social History     Socioeconomic History     Marital status:      Spouse name: Not on file     Number of children: 2     Years of education: Not on file     Highest education level: Not on file   Occupational History     Not on file   Tobacco Use     Smoking status: Never     Smokeless tobacco: Never   Vaping Use     Vaping status: Never Used   Substance and Sexual Activity     Alcohol use: Yes     Alcohol/week: 2.0 standard drinks of alcohol     Comment: 1-2/week     Drug use: Not Currently     Sexual activity: Not on file   Other Topics Concern     Not on file   Social History Narrative     Not on file     Social Determinants of  Health     Financial Resource Strain: Not on file   Food Insecurity: Not on file   Transportation Needs: Not on file   Physical Activity: Not on file   Stress: Not on file   Social Connections: Not on file   Intimate Partner Violence: Not on file   Housing Stability: Not on file          No Known Allergies      MEDICATIONS:   Current Outpatient Medications   Medication     B complex 11-folic-C-biot-zinc [id number] mg-mg-mcg-mg Tab     co-enzyme Q-10 100 MG CAPS capsule     lisinopril (ZESTRIL) 2.5 MG tablet     MULTIVITAMIN ORAL     OMEGA-3/DHA/EPA/FISH OIL (FISH OIL-OMEGA-3 FATTY ACIDS) 300-1,000 mg capsule     simvastatin (ZOCOR) 20 MG tablet     No current facility-administered medications for this visit.        Family History   Problem Relation Age of Onset     Heart Disease Father      Snoring Father      Diabetes Father      Breast Cancer Mother      Other - See Comments Mother         Cancer of the jaw     Macular Degeneration Mother      Diabetes Type 2  Brother           Review of Systems - 10 point Review of Systems is negative except for bunion left foot which is noted in HPI.    OBJECTIVE:  Appearance: alert, well appearing, and in no distress.    VITAL SIGNS: Pulse 64   Temp 98.1  F (36.7  C)   Wt 82.6 kg (182 lb 3.2 oz)   BMI 29.19 kg/m        General appearance: Patient is alert and fully cooperative with history & exam.  No sign of distress is noted during the visit.     Psychiatric: Affect is pleasant & appropriate.  Patient appears motivated to improve health.     Respiratory: Breathing is regular & unlabored while sitting.     HEENT: Hearing is intact to spoken word.  Speech is clear.  No gross evidence of visual impairment that would impact ambulation.      Vascular: Dorsalis pedis and posterior tibial pulses are palpable. There is pedal hair growth bilateral.  CFT < 3 sec from anterior tibial surface to distal digits bilateral. There is no appreciable edema noted.  Dermatologic: Turgor and  texture are within normal limits. No coloration or temperature changes. No primary or secondary lesions noted.  Neurologic: All epicritic and proprioceptive sensations are grossly intact bilateral.  Musculoskeletal: Mild bunion left foot.  Slight decreased range of motion with dorsiflexion left first MPJ, no discomfort or pain identified.  No pain palpation first MPJ left foot.

## 2023-05-30 NOTE — LETTER
5/30/2023         RE: Roberto Belle  [address] [place] [address]        Dear Colleague,    Thank you for referring your patient, Roberto Belle, to the [facility]. Please see a copy of my visit note below.          FOOT AND ANKLE SURGERY/PODIATRY CONSULT NOTE         ASSESSMENT:   Hallux abductovalgus left  Hallux limitus left  Capsulitis first MPJ left foot      TREATMENT:  -I discussed with the patient that he does have a very mild bunion on the left foot with associated degenerative changes and limited and dorsiflexion.    -Based on the current presentation we reviewed that he is not a surgical candidate.  Previous symptoms of discomfort were likely related to inflammation of the joint in the form of capsulitis.    -Reviewed treatment options including anti-inflammatory medication and avoiding barefoot walking if symptoms return.    -Discussed referral for new orthotics, patient declines.    -Patient's questions invited and answered.  He was encouraged to call my office with any further questions or concerns.     Barney Shukla DPM  [facility] Podiatry/Foot & Ankle Surgery        HPI: I was asked to see Roberto Belle today for concerns related to a bunion on his left foot.  Patient denies pain but states he has noticed that the left foot has a bunion as compared to the right foot which does not.  He has used orthotics in the past but denies any pain at this time.    Past Medical History:   Diagnosis Date     Arthritis      Calculus of kidney 8/9/2021     Hypertension          Social History     Socioeconomic History     Marital status:      Spouse name: Not on file     Number of children: 2     Years of education: Not on file     Highest education level: Not on file   Occupational History     Not on file   Tobacco Use     Smoking status: Never     Smokeless tobacco: Never   Vaping Use     Vaping status: Never Used   Substance and Sexual Activity     Alcohol  use: Yes     Alcohol/week: 2.0 standard drinks of alcohol     Comment: 1-2/week     Drug use: Not Currently     Sexual activity: Not on file   Other Topics Concern     Not on file   Social History Narrative     Not on file     Social Determinants of Health     Financial Resource Strain: Not on file   Food Insecurity: Not on file   Transportation Needs: Not on file   Physical Activity: Not on file   Stress: Not on file   Social Connections: Not on file   Intimate Partner Violence: Not on file   Housing Stability: Not on file          No Known Allergies      MEDICATIONS:   Current Outpatient Medications   Medication     B complex 11-folic-C-biot-zinc [id number] mg-mg-mcg-mg Tab     co-enzyme Q-10 100 MG CAPS capsule     lisinopril (ZESTRIL) 2.5 MG tablet     MULTIVITAMIN ORAL     OMEGA-3/DHA/EPA/FISH OIL (FISH OIL-OMEGA-3 FATTY ACIDS) 300-1,000 mg capsule     simvastatin (ZOCOR) 20 MG tablet     No current facility-administered medications for this visit.        Family History   Problem Relation Age of Onset     Heart Disease Father      Snoring Father      Diabetes Father      Breast Cancer Mother      Other - See Comments Mother         Cancer of the jaw     Macular Degeneration Mother      Diabetes Type 2  Brother           Review of Systems - 10 point Review of Systems is negative except for bunion left foot which is noted in HPI.    OBJECTIVE:  Appearance: alert, well appearing, and in no distress.    VITAL SIGNS: Pulse 64   Temp 98.1  F (36.7  C)   Wt 82.6 kg (182 lb 3.2 oz)   BMI 29.19 kg/m        General appearance: Patient is alert and fully cooperative with history & exam.  No sign of distress is noted during the visit.     Psychiatric: Affect is pleasant & appropriate.  Patient appears motivated to improve health.     Respiratory: Breathing is regular & unlabored while sitting.     HEENT: Hearing is intact to spoken word.  Speech is clear.  No gross evidence of visual impairment that would impact  ambulation.      Vascular: Dorsalis pedis and posterior tibial pulses are palpable. There is pedal hair growth bilateral.  CFT < 3 sec from anterior tibial surface to distal digits bilateral. There is no appreciable edema noted.  Dermatologic: Turgor and texture are within normal limits. No coloration or temperature changes. No primary or secondary lesions noted.  Neurologic: All epicritic and proprioceptive sensations are grossly intact bilateral.  Musculoskeletal: Mild bunion left foot.  Slight decreased range of motion with dorsiflexion left first MPJ, no discomfort or pain identified.  No pain palpation first MPJ left foot.          Again, thank you for allowing me to participate in the care of your patient.        Sincerely,        Barney Shukla DPM

## 2023-07-06 ENCOUNTER — MEDICAL CORRESPONDENCE (OUTPATIENT)
Dept: HEALTH INFORMATION MANAGEMENT | Facility: CLINIC | Age: 68
End: 2023-07-06
Payer: MEDICARE

## 2023-07-06 DIAGNOSIS — R97.20 ELEVATED PROSTATE SPECIFIC ANTIGEN (PSA): Primary | ICD-10-CM

## 2023-07-18 DIAGNOSIS — I10 ESSENTIAL HYPERTENSION: ICD-10-CM

## 2023-07-18 RX ORDER — LISINOPRIL 2.5 MG/1
2.5 TABLET ORAL DAILY
Qty: 90 TABLET | Refills: 1 | Status: SHIPPED | OUTPATIENT
Start: 2023-07-18 | End: 2024-01-17

## 2023-07-19 NOTE — TELEPHONE ENCOUNTER
"    Last Written Prescription Date:  01/02/2023  Last Fill Quantity: 90,  # refills: 1   Last office visit provider:  02/06/2023     Requested Prescriptions   Pending Prescriptions Disp Refills     lisinopril (ZESTRIL) 2.5 MG tablet 90 tablet 1     Sig: Take 1 tablet (2.5 mg) by mouth daily       ACE Inhibitors (Including Combos) Protocol Passed - 7/18/2023  7:44 PM        Passed - Blood pressure under 140/90 in past 12 months     BP Readings from Last 3 Encounters:   02/06/23 118/72   08/02/22 112/74   02/03/22 114/78                 Passed - Recent (12 mo) or future (30 days) visit within the authorizing provider's specialty     Patient has had an office visit with the authorizing provider or a provider within the authorizing providers department within the previous 12 mos or has a future within next 30 days. See \"Patient Info\" tab in inbasket, or \"Choose Columns\" in Meds & Orders section of the refill encounter.              Passed - Medication is active on med list        Passed - Patient is age 18 or older        Passed - Normal serum creatinine on file in past 12 months     Recent Labs   Lab Test 02/06/23  1006   CR 0.89       Ok to refill medication if creatinine is low          Passed - Normal serum potassium on file in past 12 months     Recent Labs   Lab Test 02/06/23  1006   POTASSIUM 4.3                  Astrid Price RN 07/18/23 7:44 PM  "

## 2023-09-26 ENCOUNTER — MYC MEDICAL ADVICE (OUTPATIENT)
Dept: INTERNAL MEDICINE | Facility: CLINIC | Age: 68
End: 2023-09-26
Payer: MEDICARE

## 2023-09-27 ENCOUNTER — VIRTUAL VISIT (OUTPATIENT)
Dept: INTERNAL MEDICINE | Facility: CLINIC | Age: 68
End: 2023-09-27
Payer: MEDICARE

## 2023-09-27 DIAGNOSIS — E78.00 HYPERCHOLESTEROLEMIA: ICD-10-CM

## 2023-09-27 DIAGNOSIS — U07.1 INFECTION DUE TO 2019 NOVEL CORONAVIRUS: Primary | ICD-10-CM

## 2023-09-27 PROCEDURE — 99442 PR PHYSICIAN TELEPHONE EVALUATION 11-20 MIN: CPT | Mod: 95 | Performed by: INTERNAL MEDICINE

## 2023-09-27 NOTE — PATIENT INSTRUCTIONS
For informational purposes only. Not to replace the advice of your health care provider. Copyright   2022 [facility]. All rights reserved. Clinically reviewed by Ivanna Wakefield, PharmD, BCACP. [employer] 956267 - REV 06/23.  COVID-19 Outpatient Treatments  Your care team can help you find the best treatments for COVID-19. Talk to a health care provider or refer to the FDA medicine fact sheets below.  Paxlovid (nirmatrelvir and ritonavir): https://www.paxlovid.TelePharm/resources  Molnupiravir (Lagevrio): https://www.fda.gov/media/136629/download  Important: We can only prescribe Paxlovid or Molnupiravir when it can be started within 5 days of first having symptoms.  Paxlovid (nimatrelvir and ritonavir)  How it works  Two medicines (nirmatrelvir and ritonavir) are taken together. They stop the virus from growing. Less amount of virus is easier for your body to fight.  Benefits  Lowers risk of a hospital stay or death from COVID-19.  How to take  Medicine comes in a daily container with both medicine tablets. Take by mouth twice daily (once in the morning, once at night) for 5 days.  The number of tablets to take varies by patient.  Don't chew or break capsules. Swallow whole.  When to take  Take it as soon as possible and within 5 days of your first symptoms.  Who can take it  Patients must be 12 years or older weigh at least 88 pounds. Paxlovid is the preferred treatment for pregnant patients.  Possible side effects  Can cause altered sense of taste, diarrhea (loose, watery stools), high blood pressure, muscle aches.  Medicine conflicts  Some medicines may conflict with Paxlovid and may cause serious side effects.  Tell your care team about all the medicines you take, including prescription and over-the-counter medicines, vitamins, and herbal supplements.  Your care team will review your medicines to make sure that you can safely take Paxlovid.  Cautions  Paxlovid is not advised for patients with severe  kidney or liver disease. If you have kidney or liver problems, the dose may need to be changed.  If you're pregnant or breastfeeding, talk to your care team about your options.  If you take hormonal birth control (such as the Pill), then you or your partner should also use a non-hormonal form of birth control (such as a condom). Keep doing this for 1 menstrual cycle after your last dose of Paxlovid.  Molnupiravir (lagevrio)  How it works  Stops the virus from growing. Less amount of virus is easier for your body to fight.  Benefits  Lowers risk of a hospital stay or death from COVID-19.  How to take  Take 4 capsules by mouth every 12 hours (4 in the morning and 4 at night) for 5 days. Don't chew or break capsules. Swallow whole.  When to take  Take as soon as possible and within 5 days of your first symptoms.  Who can take it  Patients must be 18 years or older.   Possible side effects  Diarrhea (loose, watery stools), nausea (feeling sick to your stomach), dizziness, headaches.  Medicine conflicts  Right now, there are no known conflicts with other drugs. But tell your care team about all medicines you take.  Cautions  This medicine is not advised for patients who are pregnant.  If you are someone who could become pregnant, use trusted birth control until 4 days after your last dose of molnupiravir.  If your partner could become pregnant, you should use trusted birth control until 3 months after your last dose of molnupiravir.

## 2023-09-27 NOTE — PROGRESS NOTES
"Roberto is a 67 year old who is being evaluated via a billable telephone visit.      What phone number would you like to be contacted at? [phone]  How would you like to obtain your AVS? [email or web address]    Distant Location (provider location):  On-site    Assessment & Plan     Infection due to 2019 novel coronavirus  Patient just got back from a trip to [place], became sick with a scratchy throat and cough 2 days ago.  No fever.  Tested positive for COVID yesterday.  Fully vaccinated.  He has had Paxlovid in the past and tolerates well and does wish to treat.  No shortness of breath or chest pain.  No fever.    Patient was given ER warnings if he develops chest pain or increasing shortness of breath go to the emergency room for immediate evaluation.  - nirmatrelvir and ritonavir (PAXLOVID) 300 mg/100 mg therapy pack  Dispense: 30 tablet; Refill: 0    Hypercholesterolemia  Patient was told not to take simvastatin for the 5 days on Paxlovid.    0956}     BMI:   Estimated body mass index is 29.19 kg/m  as calculated from the following:    Height as of 2/6/23: 1.683 m (5' 6.25\").    Weight as of 5/30/23: 82.6 kg (182 lb 3.2 oz).           Sheldon Harvey MD  [facility]    Subjective   Roberto is a 67 year old, presenting for the following health issues:  Covid Treatment (Covid Treatment)      HPI             Review of Systems         Objective           Vitals:  No vitals were obtained today due to virtual visit.    Physical Exam   Voice quality normal and mentation normal on the phone                Phone call duration: 12 minutes     "

## 2023-10-25 DIAGNOSIS — R97.20 ELEVATED PROSTATE SPECIFIC ANTIGEN (PSA): Primary | ICD-10-CM

## 2023-10-27 ENCOUNTER — LAB (OUTPATIENT)
Dept: LAB | Facility: CLINIC | Age: 68
End: 2023-10-27
Payer: MEDICARE

## 2023-10-27 DIAGNOSIS — R97.20 ELEVATED PROSTATE SPECIFIC ANTIGEN (PSA): ICD-10-CM

## 2023-10-27 LAB
PSA FREE MFR SERPL: 35.34 %
PSA FREE SERPL-MCNC: 1 NG/ML
PSA SERPL DL<=0.01 NG/ML-MCNC: 2.83 NG/ML (ref 0–4.5)

## 2023-10-27 PROCEDURE — 36415 COLL VENOUS BLD VENIPUNCTURE: CPT

## 2023-10-27 PROCEDURE — 84153 ASSAY OF PSA TOTAL: CPT

## 2023-10-27 PROCEDURE — 84154 ASSAY OF PSA FREE: CPT

## 2023-10-30 ENCOUNTER — TRANSFERRED RECORDS (OUTPATIENT)
Dept: HEALTH INFORMATION MANAGEMENT | Facility: CLINIC | Age: 68
End: 2023-10-30
Payer: MEDICARE

## 2023-12-08 ENCOUNTER — PATIENT OUTREACH (OUTPATIENT)
Dept: GASTROENTEROLOGY | Facility: CLINIC | Age: 68
End: 2023-12-08
Payer: MEDICARE

## 2024-01-17 DIAGNOSIS — I10 ESSENTIAL HYPERTENSION: ICD-10-CM

## 2024-01-17 RX ORDER — LISINOPRIL 2.5 MG/1
2.5 TABLET ORAL DAILY
Qty: 90 TABLET | Refills: 1 | Status: SHIPPED | OUTPATIENT
Start: 2024-01-17 | End: 2024-07-22

## 2024-03-01 SDOH — HEALTH STABILITY: PHYSICAL HEALTH: ON AVERAGE, HOW MANY DAYS PER WEEK DO YOU ENGAGE IN MODERATE TO STRENUOUS EXERCISE (LIKE A BRISK WALK)?: 5 DAYS

## 2024-03-01 SDOH — HEALTH STABILITY: PHYSICAL HEALTH: ON AVERAGE, HOW MANY MINUTES DO YOU ENGAGE IN EXERCISE AT THIS LEVEL?: 50 MIN

## 2024-03-01 ASSESSMENT — SOCIAL DETERMINANTS OF HEALTH (SDOH): HOW OFTEN DO YOU GET TOGETHER WITH FRIENDS OR RELATIVES?: ONCE A WEEK

## 2024-03-04 ENCOUNTER — OFFICE VISIT (OUTPATIENT)
Dept: INTERNAL MEDICINE | Facility: CLINIC | Age: 69
End: 2024-03-04
Payer: MEDICARE

## 2024-03-04 VITALS
HEART RATE: 65 BPM | SYSTOLIC BLOOD PRESSURE: 114 MMHG | OXYGEN SATURATION: 99 % | TEMPERATURE: 97.8 F | RESPIRATION RATE: 16 BRPM | DIASTOLIC BLOOD PRESSURE: 75 MMHG | WEIGHT: 180 LBS | BODY MASS INDEX: 28.93 KG/M2 | HEIGHT: 66 IN

## 2024-03-04 DIAGNOSIS — L98.9 SKIN LESION: ICD-10-CM

## 2024-03-04 DIAGNOSIS — Z51.81 ENCOUNTER FOR THERAPEUTIC DRUG MONITORING: ICD-10-CM

## 2024-03-04 DIAGNOSIS — R97.20 ELEVATED PROSTATE SPECIFIC ANTIGEN (PSA): ICD-10-CM

## 2024-03-04 DIAGNOSIS — Z86.0100 HISTORY OF COLONIC POLYPS: ICD-10-CM

## 2024-03-04 DIAGNOSIS — E78.00 HYPERCHOLESTEROLEMIA: ICD-10-CM

## 2024-03-04 DIAGNOSIS — I10 ESSENTIAL HYPERTENSION: ICD-10-CM

## 2024-03-04 DIAGNOSIS — Z00.00 ENCOUNTER FOR WELLNESS EXAMINATION IN ADULT: Primary | ICD-10-CM

## 2024-03-04 LAB
ALBUMIN SERPL BCG-MCNC: 4.5 G/DL (ref 3.5–5.2)
ALP SERPL-CCNC: 81 U/L (ref 40–150)
ALT SERPL W P-5'-P-CCNC: 13 U/L (ref 0–70)
ANION GAP SERPL CALCULATED.3IONS-SCNC: 10 MMOL/L (ref 7–15)
AST SERPL W P-5'-P-CCNC: 27 U/L (ref 0–45)
BILIRUB SERPL-MCNC: 0.4 MG/DL
BUN SERPL-MCNC: 20.9 MG/DL (ref 8–23)
CALCIUM SERPL-MCNC: 9.7 MG/DL (ref 8.8–10.2)
CHLORIDE SERPL-SCNC: 105 MMOL/L (ref 98–107)
CHOLEST SERPL-MCNC: 142 MG/DL
CREAT SERPL-MCNC: 0.98 MG/DL (ref 0.67–1.17)
DEPRECATED HCO3 PLAS-SCNC: 26 MMOL/L (ref 22–29)
EGFRCR SERPLBLD CKD-EPI 2021: 84 ML/MIN/1.73M2
ERYTHROCYTE [DISTWIDTH] IN BLOOD BY AUTOMATED COUNT: 15.6 % (ref 10–15)
FASTING STATUS PATIENT QL REPORTED: YES
GLUCOSE SERPL-MCNC: 100 MG/DL (ref 70–99)
HCT VFR BLD AUTO: 42.7 % (ref 40–53)
HDLC SERPL-MCNC: 56 MG/DL
HGB BLD-MCNC: 14.2 G/DL (ref 13.3–17.7)
LDLC SERPL CALC-MCNC: 59 MG/DL
MCH RBC QN AUTO: 27.2 PG (ref 26.5–33)
MCHC RBC AUTO-ENTMCNC: 33.3 G/DL (ref 31.5–36.5)
MCV RBC AUTO: 82 FL (ref 78–100)
NONHDLC SERPL-MCNC: 86 MG/DL
PLATELET # BLD AUTO: 195 10E3/UL (ref 150–450)
POTASSIUM SERPL-SCNC: 4.5 MMOL/L (ref 3.4–5.3)
PROT SERPL-MCNC: 7.2 G/DL (ref 6.4–8.3)
PSA SERPL DL<=0.01 NG/ML-MCNC: 2.72 NG/ML (ref 0–4.5)
RBC # BLD AUTO: 5.22 10E6/UL (ref 4.4–5.9)
SODIUM SERPL-SCNC: 141 MMOL/L (ref 135–145)
TRIGL SERPL-MCNC: 137 MG/DL
WBC # BLD AUTO: 5.7 10E3/UL (ref 4–11)

## 2024-03-04 PROCEDURE — 84153 ASSAY OF PSA TOTAL: CPT | Performed by: INTERNAL MEDICINE

## 2024-03-04 PROCEDURE — 36415 COLL VENOUS BLD VENIPUNCTURE: CPT | Performed by: INTERNAL MEDICINE

## 2024-03-04 PROCEDURE — 99214 OFFICE O/P EST MOD 30 MIN: CPT | Mod: 25 | Performed by: INTERNAL MEDICINE

## 2024-03-04 PROCEDURE — 80061 LIPID PANEL: CPT | Performed by: INTERNAL MEDICINE

## 2024-03-04 PROCEDURE — 85027 COMPLETE CBC AUTOMATED: CPT | Performed by: INTERNAL MEDICINE

## 2024-03-04 PROCEDURE — 80053 COMPREHEN METABOLIC PANEL: CPT | Performed by: INTERNAL MEDICINE

## 2024-03-04 PROCEDURE — G0439 PPPS, SUBSEQ VISIT: HCPCS | Performed by: INTERNAL MEDICINE

## 2024-03-04 RX ORDER — SIMVASTATIN 20 MG
20 TABLET ORAL AT BEDTIME
Qty: 90 TABLET | Refills: 3 | Status: SHIPPED | OUTPATIENT
Start: 2024-03-04

## 2024-03-04 NOTE — PATIENT INSTRUCTIONS
You are in great shape.  Keep it up.  Continue your regular exercise, but stretch and prepare for exercise.  Do not injure yourself with exercises.  Continue to main your core muscle strength, as that will help protect your back the best.    Continue on current medications.  Goal blood pressure less than 135/85 but not less than 110/60.    If your PSA remains stable, I would recommend simply returning to yearly screening monitoring.    Your colonoscopy will be due February 2025.    You have a slight skin blemish on your left cheek with low suspicion for skin cancer.  But I would recommend evaluation with dermatology for that spot this year.    You can restart omeprazole 20 mg daily to see if that helps with your cough.  You can return to the ENT clinic for further evaluation if you wish.    See me in 1 year for adult wellness visit physical exam and blood pressure follow-up appointment.

## 2024-03-04 NOTE — PROGRESS NOTES
Preventive Care Visit  [facility]  Sheldon Harvey MD, Internal Medicine  Mar 4, 2024        Roberto Belle   68 year old male    Date of Visit: 3/4/2024    Chief Complaint   Patient presents with    Medicare Visit     Fasting.     Subjective  68-year-old male lives independently with wife.  Remains active with pickleball 4-5 days a week with good exertional ability and no musculoskeletal pain issues.  He has had some previous back pain issues but working on core muscle strength and not a problem currently.  Also goes to the gym some.    No shortness of breath or chest pain with activity.    2021 cardiac CT scan calcium score 52 but no event.  Is never smoked.    Hypercholesterolemia is well-controlled with simvastatin 20 mg a day without diffuse myalgias.  Does not take aspirin daily.    Blood pressure has been well-controlled when he checks it at the platelet donation center.  But does not check it regularly on his own.  Denies orthostasis on lisinopril 2.5 mg a day.    2022 CT scan negative AAA.    Bowels are normal and no blood in stool.  He had a colon polyp 2020 with a 5-year follow-up due February 2022.  His bowels are normal.  Does take Metamucil.    No swallowing difficulty.  EGD in 2020 negative for Fraser's.    He does have some globus sensation and some chronic throat clearing, reflux.  He has taken omeprazole in the past with some benefit.  Tried for short duration with unclear benefit recently.  No fever or sinusitis symptoms.  He is try the nasal allergy symptoms medicine without much luck.  No hemoptysis.    No history of skin cancer.  Had a seborrheic keratosis last year on his chest.  He has noticed a spot on his left cheek but has been stable.    No headache complaints.    Did see ophthalmology in January with a normal eye exam.        PMHx:    Past Medical History:   Diagnosis Date    Arthritis     Calculus of kidney 8/9/2021    Hypertension      PSHx:    Past Surgical  "History:   Procedure Laterality Date    COMBINED CYSTOSCOPY, INSERT STENT URETER(S) Left 8/6/2021    Procedure: CYSTOURETEROSCOPY, WITH RETROGRADE PYELOGRAM, LASER LITHOTRIPSY, CALCULUS REMOVAL AND URETERAL STENT INSERTION;  Surgeon: Aneesh Michael MD;  Location: [facility] OR    COMBINED CYSTOSCOPY, RETROGRADES, EXCHANGE STENT URETER(S) Left 8/24/2021    Procedure: CYSTOSCOPY, WITH RETROGRADE PYELOGRAM, LASER LITHOTRIPSY, CALCULUS REMOVAL AND URETERAL STENT REPLACEMENT;  Surgeon: Aneesh Michael MD;  Location: [facility] OR    UNDESCENDED TESTICLE EXPLORATION Left 1968     Immunizations:   Immunization History   Administered Date(s) Administered    COVID-19 Bivalent 12+ (Pfizer) 01/16/2023    COVID-19 MONOVALENT 12+ (Pfizer) 02/24/2021, 03/17/2021, 11/15/2021, 05/23/2022    Flu, Unspecified 10/19/2015, 09/20/2016, 09/18/2017, 09/24/2018, 09/24/2019, 09/22/2020, 09/01/2021, 09/28/2021, 10/09/2023    Influenza (IIV3) PF 10/12/2010, 10/11/2011, 10/09/2012, 09/24/2013, 10/23/2014    Influenza Vaccine 65+ (Fluzone HD) 09/28/2021, 10/05/2022, 10/05/2023    Influenza Vaccine, 6+MO IM (QUADRIVALENT W/PRESERVATIVES) 10/19/2015, 09/18/2017, 09/18/2018, 09/24/2019, 09/22/2020    Influenza vaccine ages 6-35 months 10/19/2015, 09/18/2017, 09/18/2018, 09/24/2019, 09/22/2020    Influenza, seasonal, injectable, PF 10/13/2009    Pneumo Conj 13-V (2010&after) 12/01/2019, 12/01/2020, 01/21/2021    Pneumococcal 23 valent 12/29/2019, 01/01/2021, 02/03/2022    RSV Vaccine (Arexvy) 12/13/2023    TDAP (Adacel,Boostrix) 11/18/2011, 07/13/2020    Td (Adult), Adsorbed 12/15/2000    Zoster recombinant adjuvanted (SHINGRIX) 10/18/2019, 01/02/2020    Zoster vaccine, live 11/01/2019       ROS A comprehensive review of systems was performed and was otherwise negative    Medications, allergies, and problem list were reviewed and updated    Exam  /75   Pulse 65   Temp 97.8  F (36.6  C)   Resp 16   Ht 1.681 m (5' 6.2\")   Wt " 81.6 kg (180 lb)   SpO2 99%   BMI 28.88 kg/m    Appears well.  Healthy-appearing patient.  Good muscle conditioning and minimal adiposity.  Mobility is normal.  Clock face drawing normal and word recall normal.  Pupils and irises equal and reactive.  Extraocular muscles intact.  No jaundice or conjunctivitis.  External ears and nose exam is normal.  Membranes are normal.  Pharynx is normal.  Teeth in good condition.  No cervical or supraclavicular or axillary adenopathy.  No JVD and no carotid bruits.  No thyromegaly or nodularity to inspection and palpation.  Lungs are clear to auscultation with good respiratory excursion.  Heart is regular with no murmur rub or gallop.  +1 pedal pulses bilaterally.  No ankle edema and feet in good condition.  Abdomen is nonobese nontender no hepatosplenomegaly.  No pulsatile abdominal mass.  Skin exam with a slight skin blemish on his left cheek but without raised or sclerotic nature to it.  Rectal exam shows a mildly enlarged prostate but smooth, right lobe slightly bigger than left lobe but no nodule.    Assessment/Plan  1. Encounter for wellness examination in adult  Main focus for patient is cardiovascular risk, but he has his risk factors well-controlled and maintains a good exercise and diet regimen.    I emphasized the importance of continuing his regularity of exercise.    He did not want to get COVID shots.  Otherwise up-to-date on immunizations.    Yearly eye exam in January    Patient is full code    Patient instructions:  You are in great shape.  Keep it up.  Continue your regular exercise, but stretch and prepare for exercise.  Do not injure yourself with exercises.  Continue to main your core muscle strength, as that will help protect your back the best.    Continue on current medications.  Goal blood pressure less than 135/85 but not less than 110/60.    If your PSA remains stable, I would recommend simply returning to yearly screening monitoring.    Your  colonoscopy will be due February 2025.    You have a slight skin blemish on your left cheek with low suspicion for skin cancer.  But I would recommend evaluation with dermatology for that spot this year.    You can restart omeprazole 20 mg daily to see if that helps with your cough.  You can return to the ENT clinic for further evaluation if you wish.    See me in 1 year for adult wellness visit physical exam and blood pressure follow-up appointment.      2. Essential hypertension  Controlled.  Continue low-dose lisinopril.    3. Hypercholesterolemia  Goal LDL less than 130.  Continue current simvastatin.  - simvastatin (ZOCOR) 20 MG tablet; Take 1 tablet (20 mg) by mouth at bedtime  Dispense: 90 tablet; Refill: 3  - Lipid Profile    4. History of colonic polyps  5-year colonoscopy due February 2025    5. Encounter for therapeutic drug monitoring    - CBC with platelets  - Comprehensive metabolic panel    6. Elevated prostate specific antigen (PSA)  His PSA has been in the normal range and stable recently.  He wanted to check it again early 1 more time.  But if it remains stable I would go back to regular yearly PSA checks.  - PSA tumor marker    Skin lesion on left cheek.  I did discuss with patient that low suspicion for skin cancer but I did recommend evaluation with dermatology.  Likely a sun damage blemish.    Cough and clearing throat, has been associated with reflux in the past and I did recommend he restart omeprazole daily for at least 1 month.  He was told he could be reevaluated with ENT if symptoms continue.      Return in about 1 year (around 3/4/2025) for Adult wellness visit physical exam.   Patient Instructions   You are in great shape.  Keep it up.  Continue your regular exercise, but stretch and prepare for exercise.  Do not injure yourself with exercises.  Continue to main your core muscle strength, as that will help protect your back the best.    Continue on current medications.  Goal blood  pressure less than 135/85 but not less than 110/60.    If your PSA remains stable, I would recommend simply returning to yearly screening monitoring.    Your colonoscopy will be due February 2025.    You have a slight skin blemish on your left cheek with low suspicion for skin cancer.  But I would recommend evaluation with dermatology for that spot this year.    You can restart omeprazole 20 mg daily to see if that helps with your cough.  You can return to the ENT clinic for further evaluation if you wish.    See me in 1 year for adult wellness visit physical exam and blood pressure follow-up appointment.    Sheldon Harvey MD, MD        Current Outpatient Medications   Medication Sig Dispense Refill    B complex 11-folic-C-biot-zinc [phone] mg-mg-mcg-mg Tab [B COMPLEX 11-FOLIC-C-BIOT-ZINC [phone] MG-MG-MCG-MG TAB] Take 1 tablet by mouth every other day.       co-enzyme Q-10 100 MG CAPS capsule Take 100 mg by mouth 2 times daily      lisinopril (ZESTRIL) 2.5 MG tablet Take 1 tablet (2.5 mg) by mouth daily 90 tablet 1    MULTIVITAMIN ORAL [MULTIVITAMIN ORAL] Take 1 tablet by mouth daily.             OMEGA-3/DHA/EPA/FISH OIL (FISH OIL-OMEGA-3 FATTY ACIDS) 300-1,000 mg capsule [OMEGA-3/DHA/EPA/FISH OIL (FISH OIL-OMEGA-3 FATTY ACIDS) 300-1,000 MG CAPSULE] Take 2 g by mouth daily.      simvastatin (ZOCOR) 20 MG tablet Take 1 tablet (20 mg) by mouth at bedtime 90 tablet 3     No Known Allergies  Social History     Tobacco Use    Smoking status: Never     Passive exposure: Past    Smokeless tobacco: Never   Vaping Use    Vaping Use: Never used   Substance Use Topics    Alcohol use: Yes     Alcohol/week: 2.0 standard drinks of alcohol     Comment: 1-2/week    Drug use: Not Currently             Subjective   Roberto is a 68 year old, presenting for the following:  Medicare Visit (Fasting.)        3/4/2024     9:17 AM   Additional Questions   Roomed by Edith INMAN   Accompanied by na         Health Care Directive  Patient  does not have a Health Care Directive or Living Will: Discussed advance care planning with patient; information given to patient to review.    HPI              3/1/2024   General Health   How would you rate your overall physical health? Good   Feel stress (tense, anxious, or unable to sleep) Not at all         3/1/2024   Nutrition   Diet: Regular (no restrictions)         3/1/2024   Exercise   Days per week of moderate/strenous exercise 5 days   Average minutes spent exercising at this level 50 min         3/1/2024   Social Factors   Frequency of gathering with friends or relatives Once a week   Worry food won't last until get money to buy more No   Food not last or not have enough money for food? No   Do you have housing?  Yes   Are you worried about losing your housing? No   Lack of transportation? No   Unable to get utilities (heat,electricity)? No         3/1/2024   Fall Risk   Fallen 2 or more times in the past year? No    No   Trouble with walking or balance? No    No          3/1/2024   Activities of Daily Living- Home Safety   Needs help with the following daily activites None of the above   Safety concerns in the home None of the above         3/1/2024   Dental   Dentist two times every year? Yes         3/1/2024   Hearing Screening   Hearing concerns? None of the above         3/1/2024   Driving Risk Screening   Patient/family members have concerns about driving No         3/1/2024   General Alertness/Fatigue Screening   Have you been more tired than usual lately? No         3/1/2024   Urinary Incontinence Screening   Bothered by leaking urine in past 6 months No         3/1/2024   TB Screening   Were you born outside of [place]?  No         Today's PHQ-2 Score:       3/4/2024     9:11 AM   PHQ-2 ( 1999 Pfizer)   Q1: Little interest or pleasure in doing things 0   Q2: Feeling down, depressed or hopeless 0   PHQ-2 Score 0   Q1: Little interest or pleasure in doing things Not at all   Q2: Feeling down, depressed  or hopeless Not at all   PHQ-2 Score 0           3/1/2024   Substance Use   Alcohol more than 3/day or more than 7/wk No   Do you have a current opioid prescription? No   How severe/bad is pain from 1 to 10? 0/10 (No Pain)   Do you use any other substances recreationally? No     Social History     Tobacco Use    Smoking status: Never     Passive exposure: Past    Smokeless tobacco: Never   Vaping Use    Vaping Use: Never used   Substance Use Topics    Alcohol use: Yes     Alcohol/week: 2.0 standard drinks of alcohol     Comment: 1-2/week    Drug use: Not Currently           3/1/2024   AAA Screening   Family history of Abdominal Aortic Aneurysm (AAA)? No   Last PSA:   Prostate Specific Antigen Screen   Date Value Ref Range Status   02/03/2022 2.83 0.00 - 4.50 ug/L Final     PSA Tumor Marker   Date Value Ref Range Status   10/27/2023 2.83 0.00 - 4.50 ng/mL Final     ASCVD Risk   The 10-year ASCVD risk score (Tamela SMALLS, et al., 2019) is: 12%    Values used to calculate the score:      Age: 68 years      Sex: Male      Is Non- : No      Diabetic: No      Tobacco smoker: No      Systolic Blood Pressure: 114 mmHg      Is BP treated: Yes      HDL Cholesterol: 61 mg/dL      Total Cholesterol: 159 mg/dL            Reviewed and updated as needed this visit by Provider                      Current providers sharing in care for this patient include:  Patient Care Team:  Sheldon Harvey MD as PCP - General (Internal Medicine)  Sheldon Harvey MD as Assigned PCP  Barney Shukla DPM as Assigned Surgical Provider    The following health maintenance items are reviewed in Epic and correct as of today:  Health Maintenance   Topic Date Due    ANNUAL REVIEW OF HM ORDERS  Never done    HEPATITIS C SCREENING  Never done    COVID-19 Vaccine (6 - 2023-24 season) 09/01/2023    LIPID  02/06/2024    MEDICARE ANNUAL WELLNESS VISIT  02/06/2024    COLORECTAL CANCER SCREENING  01/22/2025    FALL RISK  "ASSESSMENT  03/04/2025    GLUCOSE  02/06/2026    ADVANCE CARE PLANNING  02/06/2028    DTAP/TDAP/TD IMMUNIZATION (3 - Td or Tdap) 07/13/2030    PHQ-2 (once per calendar year)  Completed    INFLUENZA VACCINE  Completed    Pneumococcal Vaccine: 65+ Years  Completed    ZOSTER IMMUNIZATION  Completed    RSV VACCINE (Pregnancy & 60+)  Completed    IPV IMMUNIZATION  Aged Out    HPV IMMUNIZATION  Aged Out    MENINGITIS IMMUNIZATION  Aged Out    RSV MONOCLONAL ANTIBODY  Aged Out            Objective    Exam  /75   Pulse 65   Temp 97.8  F (36.6  C)   Resp 16   Ht 1.681 m (5' 6.2\")   Wt 81.6 kg (180 lb)   SpO2 99%   BMI 28.88 kg/m     Estimated body mass index is 28.88 kg/m  as calculated from the following:    Height as of this encounter: 1.681 m (5' 6.2\").    Weight as of this encounter: 81.6 kg (180 lb).    Physical Exam           3/4/2024   Mini Cog   Clock Draw Score 2 Normal   3 Item Recall 3 objects recalled   Mini Cog Total Score 5            Signed Electronically by: Sheldon Harvey MD    "

## 2024-07-21 DIAGNOSIS — I10 ESSENTIAL HYPERTENSION: ICD-10-CM

## 2024-07-22 RX ORDER — LISINOPRIL 2.5 MG/1
2.5 TABLET ORAL DAILY
Qty: 90 TABLET | Refills: 3 | Status: SHIPPED | OUTPATIENT
Start: 2024-07-22

## 2024-10-22 ENCOUNTER — PATIENT OUTREACH (OUTPATIENT)
Dept: GASTROENTEROLOGY | Facility: CLINIC | Age: 69
End: 2024-10-22
Payer: MEDICARE

## 2024-10-22 DIAGNOSIS — Z12.11 SPECIAL SCREENING FOR MALIGNANT NEOPLASMS, COLON: Primary | ICD-10-CM

## 2024-10-22 NOTE — PROGRESS NOTES
"Hx adenomatous polyps with 5yr recall recommended on last colonoscopy performed in 2020    [facility] Screening Colonoscopy Referral Review    Patient meets the inclusion criteria for screening colonoscopy standing order.    Ordering/Referring Provider:  Sheldon Harvey      BMI: Estimated body mass index is 28.88 kg/m  as calculated from the following:    Height as of 3/4/24: 1.681 m (5' 6.2\").    Weight as of 3/4/24: 81.6 kg (180 lb).     Sedation:  Does patient have any of the following conditions affecting sedation?  No medical conditions affecting sedation.    Previous Scopes:  Any previous recommendations or follow up needs based on previous scope?  na / No recommendations.    Medical Concerns to Postpone Order:  Does patient have any of the following medical concerns that should postpone/delay colonoscopy referral?  No medical conditions affecting colonoscopy referral.    Final Referral Details:  Based on patient's medical history patient is appropriate for referral order with moderate sedation. If patient's BMI > 50 do not schedule in ASC.  "

## 2024-10-23 NOTE — PROGRESS NOTES
Pt has colonoscopy scheduled with [facility] in February. Order that was created this encounter has been canceled.

## 2024-12-23 ENCOUNTER — PATIENT OUTREACH (OUTPATIENT)
Dept: CARE COORDINATION | Facility: CLINIC | Age: 69
End: 2024-12-23
Payer: MEDICARE

## 2025-02-28 ENCOUNTER — TRANSFERRED RECORDS (OUTPATIENT)
Dept: HEALTH INFORMATION MANAGEMENT | Facility: CLINIC | Age: 70
End: 2025-02-28
Payer: MEDICARE

## 2025-03-06 PROBLEM — D12.6 ADENOMATOUS POLYP OF COLON: Status: ACTIVE | Noted: 2025-03-06

## 2025-03-16 SDOH — HEALTH STABILITY: PHYSICAL HEALTH: ON AVERAGE, HOW MANY DAYS PER WEEK DO YOU ENGAGE IN MODERATE TO STRENUOUS EXERCISE (LIKE A BRISK WALK)?: 4 DAYS

## 2025-03-16 SDOH — HEALTH STABILITY: PHYSICAL HEALTH: ON AVERAGE, HOW MANY MINUTES DO YOU ENGAGE IN EXERCISE AT THIS LEVEL?: 60 MIN

## 2025-03-16 ASSESSMENT — SOCIAL DETERMINANTS OF HEALTH (SDOH): HOW OFTEN DO YOU GET TOGETHER WITH FRIENDS OR RELATIVES?: ONCE A WEEK

## 2025-03-19 ENCOUNTER — OFFICE VISIT (OUTPATIENT)
Dept: INTERNAL MEDICINE | Facility: CLINIC | Age: 70
End: 2025-03-19
Payer: MEDICARE

## 2025-03-19 VITALS
RESPIRATION RATE: 16 BRPM | OXYGEN SATURATION: 98 % | SYSTOLIC BLOOD PRESSURE: 120 MMHG | HEART RATE: 64 BPM | DIASTOLIC BLOOD PRESSURE: 80 MMHG | BODY MASS INDEX: 29.25 KG/M2 | HEIGHT: 66 IN | WEIGHT: 182 LBS | TEMPERATURE: 97.3 F

## 2025-03-19 DIAGNOSIS — E78.00 HYPERCHOLESTEROLEMIA: ICD-10-CM

## 2025-03-19 DIAGNOSIS — D64.9 ANEMIA, UNSPECIFIED TYPE: ICD-10-CM

## 2025-03-19 DIAGNOSIS — I10 ESSENTIAL HYPERTENSION: ICD-10-CM

## 2025-03-19 DIAGNOSIS — Z86.0100 HISTORY OF COLONIC POLYPS: ICD-10-CM

## 2025-03-19 DIAGNOSIS — Z00.00 ANNUAL WELLNESS VISIT: Primary | ICD-10-CM

## 2025-03-19 DIAGNOSIS — Z12.5 SCREENING FOR PROSTATE CANCER: ICD-10-CM

## 2025-03-19 DIAGNOSIS — E78.00 PURE HYPERCHOLESTEROLEMIA: ICD-10-CM

## 2025-03-19 DIAGNOSIS — Z51.81 ENCOUNTER FOR THERAPEUTIC DRUG MONITORING: ICD-10-CM

## 2025-03-19 LAB
ERYTHROCYTE [DISTWIDTH] IN BLOOD BY AUTOMATED COUNT: 18.6 % (ref 10–15)
HCT VFR BLD AUTO: 40.2 % (ref 40–53)
HGB BLD-MCNC: 13.1 G/DL (ref 13.3–17.7)
MCH RBC QN AUTO: 25.4 PG (ref 26.5–33)
MCHC RBC AUTO-ENTMCNC: 32.6 G/DL (ref 31.5–36.5)
MCV RBC AUTO: 78 FL (ref 78–100)
PLATELET # BLD AUTO: 199 10E3/UL (ref 150–450)
RBC # BLD AUTO: 5.15 10E6/UL (ref 4.4–5.9)
WBC # BLD AUTO: 6.3 10E3/UL (ref 4–11)

## 2025-03-19 PROCEDURE — 85027 COMPLETE CBC AUTOMATED: CPT | Performed by: INTERNAL MEDICINE

## 2025-03-19 PROCEDURE — 36415 COLL VENOUS BLD VENIPUNCTURE: CPT | Performed by: INTERNAL MEDICINE

## 2025-03-19 RX ORDER — LISINOPRIL 2.5 MG/1
2.5 TABLET ORAL DAILY
Qty: 90 TABLET | Refills: 3 | Status: SHIPPED | OUTPATIENT
Start: 2025-03-19

## 2025-03-19 RX ORDER — SIMVASTATIN 20 MG
20 TABLET ORAL AT BEDTIME
Qty: 90 TABLET | Refills: 3 | Status: SHIPPED | OUTPATIENT
Start: 2025-03-19

## 2025-03-19 RX ORDER — FLUTICASONE PROPIONATE 50 MCG
1 SPRAY, SUSPENSION (ML) NASAL PRN
COMMUNITY

## 2025-03-19 NOTE — PROGRESS NOTES
Preventive Care Visit  [facility]  Sheldon Harvey MD, Internal Medicine  Mar 19, 2025          Roberto Belle   69 year old male    Date of Visit: 3/19/2025    Chief Complaint   Patient presents with    Medicare Visit     fasting     Subjective  69-year-old male here for annual wellness visit.  Feeling well overall.  He has not checked his blood pressure recently but that is been well-controlled and he denies orthostasis.  He is on lisinopril 2.5 mg daily.    Plays [employer] ball 5 days a week which she enjoys.  He does some stretching.  No fall or injury.  But he is developed some right shoulder tendinitis when serving    No chest pain or increasing shortness of breath with activity.  No palpitations or fainting spells.  No lower extremity edema.    2021 cardiac CT scan calcium score of 52.  No chest pain or cardiac event.  He does not take aspirin.  He donates platelets and blood on a regular basis.    I did tell him that his last donation attempt that he was mildly anemic and he did not donate.  He has not been taking an iron supplement.  There is been no GI bleeding or blood in stool or melena.    Occasional reflux symptoms/postnasal drip type symptoms with globus sensation but denies heartburn or abdominal pain.  2020 EGD was negative for Fraser's.    He has been finding more benefit from Flonase with his postnasal drip globus sensation rather than the omeprazole which she just takes as needed.    February 2025 colonoscopy showed a 5 mm tubular adenoma with a 5-year follow-up plan.    No headache complaint OD times the penis.    No generalized myalgia complaints.  On simvastatin 20 mg with well-controlled cholesterol last year.    Never smoked.  No swallowing issues or new cough.    No significant new urinary symptoms.  PSA level was normal last year.    Denies any eye or vision changes.  No headache complaints.  He does believe he saw ophthalmology earlier this winter but he is unsure.  He  reported normal eye exam January 2024.    PMHx:    Past Medical History:   Diagnosis Date    Arthritis     Calculus of kidney 8/9/2021    Hypertension      PSHx:    Past Surgical History:   Procedure Laterality Date    COMBINED CYSTOSCOPY, INSERT STENT URETER(S) Left 8/6/2021    Procedure: CYSTOURETEROSCOPY, WITH RETROGRADE PYELOGRAM, LASER LITHOTRIPSY, CALCULUS REMOVAL AND URETERAL STENT INSERTION;  Surgeon: Aneesh Michael MD;  Location: [facility] OR    COMBINED CYSTOSCOPY, RETROGRADES, EXCHANGE STENT URETER(S) Left 8/24/2021    Procedure: CYSTOSCOPY, WITH RETROGRADE PYELOGRAM, LASER LITHOTRIPSY, CALCULUS REMOVAL AND URETERAL STENT REPLACEMENT;  Surgeon: Aneesh Michael MD;  Location: [facility] OR    UNDESCENDED TESTICLE EXPLORATION Left 1968     Immunizations:   Immunization History   Administered Date(s) Administered    COVID-19 12+ (MODERNA) 03/14/2024, 11/11/2024    COVID-19 Bivalent 12+ (Pfizer) 01/16/2023    COVID-19 MONOVALENT 12+ (Pfizer) 02/24/2021, 03/17/2021, 11/15/2021, 05/23/2022    Flu, Unspecified 10/19/2015, 09/20/2016, 09/18/2017, 09/24/2018, 09/24/2019, 09/22/2020, 09/01/2021, 09/28/2021, 10/09/2023    Influenza (High Dose) Trivalent,PF (Fluzone) 10/04/2024    Influenza (IIV3) PF 10/12/2010, 10/11/2011, 10/09/2012, 09/24/2013, 10/23/2014    Influenza (prior to 2024) 10/13/2009    Influenza Vaccine 65+ (Fluzone HD) 09/28/2021, 10/05/2022, 10/05/2023    Influenza Vaccine, 6+MO IM (QUADRIVALENT W/PRESERVATIVES) 10/19/2015, 09/18/2017, 09/18/2018, 09/24/2019, 09/22/2020    Influenza vaccine ages 6-35 months 10/19/2015, 09/18/2017, 09/18/2018, 09/24/2019, 09/22/2020    Pneumo Conj 13-V (2010&after) 12/01/2019, 12/01/2020, 01/21/2021    Pneumococcal 23 valent 12/29/2019, 01/01/2021, 02/03/2022    RSV Vaccine (Arexvy) 12/13/2023    TDAP (Adacel,Boostrix) 11/18/2011, 07/13/2020    Td (Adult), Adsorbed 12/15/2000    Zoster recombinant adjuvanted (Shingrix) 10/18/2019, 01/02/2020    Zoster  "vaccine, live 11/01/2019       ROS A comprehensive review of systems was performed and was otherwise negative    Medications, allergies, and problem list were reviewed and updated    Exam  /80   Pulse 64   Temp 97.3  F (36.3  C)   Resp 16   Ht 1.681 m (5' 6.2\")   Wt 82.6 kg (182 lb)   SpO2 98%   BMI 29.20 kg/m    Healthy-appearing male.  Clock face drawing and word recall normal.  Mobility exam is normal.  He is in good physical condition, with minimal adiposity.  Pupils and irises equal and reactive.  Extraocular muscles intact.  No jaundice or conjunctivitis.  External ears and nose exam is normal with minimal cerumen.  Normal tympanic membranes.  Pharynx is normal and not crowded.  Teeth in good condition.  No cervical or supraclavicular or axillary adenopathy.  No JVD and no carotid bruits.  No thyromegaly or nodularity to inspection and palpation.  Lungs clear to auscultation with good respiratory excursion.  Heart is regular with no murmur rub or gallop.  +1 pedal pulses bilaterally and no ankle edema.  Feet in good condition.  Abdomen is nonobese nontender no hepatosplenomegaly or pulsatile abdominal mass.  Skin exam without suspicious skin lesions.  Rectal exam shows a smooth symmetric prostate without nodule.    Assessment/Plan  1. Annual wellness visit (Primary)  Main focus for patient is maintaining a healthy lifestyle and regular activity.  I stressed the importance of stretching and avoiding injury with his activities such as pickleball.    Moderate cardiovascular risk profile but his risk factors are well-controlled.  Remains asymptomatic and active.    Full CODE STATUS.    Up-to-date with immunizations.    He was reminded to make sure that he has his yearly eye exam with glaucoma screening done.    2. Screening for prostate cancer  Yearly screening  - Prostate Specific Antigen Screen    3. History of colonic polyps  5-year follow-up will be due February 2030    4. Essential " hypertension  Well-controlled.  Continue current dose.  1 year follow-up  - lisinopril (ZESTRIL) 2.5 MG tablet; Take 1 tablet (2.5 mg) by mouth daily.  Dispense: 90 tablet; Refill: 3    5. Pure hypercholesterolemia  Goal LDL less than 100.  Well-controlled and tolerating his current simvastatin  - Lipid panel reflex to direct LDL Fasting    6. Anemia, unspecified type  Unclear if true anemia.  He may be getting anemic with regular blood transfusions.  He may need an iron supplement.  Further evaluation as below.  Hold off on further blood transfusions.  At this time if anemic.    No evidence of GI bleeding and he just had his colonoscopy.  - CBC with platelets  - Ferritin  - Iron & Iron Binding Capacity  - Vitamin B12    7. Encounter for therapeutic drug monitoring    - Comprehensive metabolic panel    8. Hypercholesterolemia  Refill given  - simvastatin (ZOCOR) 20 MG tablet; Take 1 tablet (20 mg) by mouth at bedtime.  Dispense: 90 tablet; Refill: 3    Globus sensation with chronic throat clearing, may be some element of reflux but just using omeprazole as needed.  He may have some element of postnasal drip and finds more benefit from the Flonase.      Return in about 1 year (around 3/19/2026) for Annual wellness visit.   Patient Instructions   Goal blood pressure less than 135/85 but not less than 110/60.    Avoid over exuberant exercise that causes pain or strain such as your shoulder tendinitis.  Increase stretching before playing pickle ball.    Repeat colonoscopy will be due February 2030.    Continue yearly PSA checks.    Yearly eye exams for glaucoma and retinal screening are recommended.  You may be due for your yearly eye exam this winter.    Follow-up in 1 year for annual wellness visit and blood pressure checkup appointment.    Sheldon Harvey MD, MD        Current Outpatient Medications   Medication Sig Dispense Refill    co-enzyme Q-10 100 MG CAPS capsule Take 100 mg by mouth daily.      fluticasone  (FLONASE) 50 MCG/ACT nasal spray Spray 1 spray into both nostrils as needed for rhinitis or allergies.      lisinopril (ZESTRIL) 2.5 MG tablet Take 1 tablet (2.5 mg) by mouth daily. 90 tablet 3    MULTIVITAMIN ORAL [MULTIVITAMIN ORAL] Take 1 tablet by mouth daily.             OMEGA-3/DHA/EPA/FISH OIL (FISH OIL-OMEGA-3 FATTY ACIDS) 300-1,000 mg capsule [OMEGA-3/DHA/EPA/FISH OIL (FISH OIL-OMEGA-3 FATTY ACIDS) 300-1,000 MG CAPSULE] Take 2 g by mouth daily.      simvastatin (ZOCOR) 20 MG tablet Take 1 tablet (20 mg) by mouth at bedtime. 90 tablet 3     No Known Allergies  Social History     Tobacco Use    Smoking status: Never     Passive exposure: Past    Smokeless tobacco: Never   Vaping Use    Vaping status: Never Used   Substance Use Topics    Alcohol use: Yes     Alcohol/week: 2.0 standard drinks of alcohol     Comment: 1-2/week    Drug use: Not Currently             Subjective   Roberto is a 69 year old, presenting for the following:  Medicare Visit (fasting)        3/19/2025     8:58 AM   Additional Questions   Roomed by Edith INMAN   Accompanied by julianne RADER           Advance Care Planning  Patient does not have a Health Care Directive: Discussed advance care planning with patient; information given to patient to review.      3/16/2025   General Health   How would you rate your overall physical health? Good   Feel stress (tense, anxious, or unable to sleep) Not at all         3/16/2025   Nutrition   Diet: Regular (no restrictions)         3/16/2025   Exercise   Days per week of moderate/strenous exercise 4 days   Average minutes spent exercising at this level 60 min         3/16/2025   Social Factors   Frequency of gathering with friends or relatives Once a week   Worry food won't last until get money to buy more No   Food not last or not have enough money for food? No   Do you have housing? (Housing is defined as stable permanent housing and does not include staying ouside in a car, in a tent, in an abandoned  building, in an overnight shelter, or couch-surfing.) Yes   Are you worried about losing your housing? No   Lack of transportation? No   Unable to get utilities (heat,electricity)? No         3/16/2025   Fall Risk   Fallen 2 or more times in the past year? No   Trouble with walking or balance? No          3/16/2025   Activities of Daily Living- Home Safety   Needs help with the following daily activites None of the above   Safety concerns in the home No grab bars in the bathroom         3/16/2025   Dental   Dentist two times every year? Yes         3/16/2025   Hearing Screening   Hearing concerns? None of the above         3/16/2025   Driving Risk Screening   Patient/family members have concerns about driving No         3/16/2025   General Alertness/Fatigue Screening   Have you been more tired than usual lately? No         3/16/2025   Urinary Incontinence Screening   Bothered by leaking urine in past 6 months No           3/1/2024   TB Screening   Were you born outside of the [place]? No           Today's PHQ-2 Score:       3/19/2025     8:50 AM   PHQ-2 ( 1999 Pfizer)   Q1: Little interest or pleasure in doing things 0   Q2: Feeling down, depressed or hopeless 0   PHQ-2 Score 0    Q1: Little interest or pleasure in doing things Not at all   Q2: Feeling down, depressed or hopeless Not at all   PHQ-2 Score 0       Patient-reported           3/16/2025   Substance Use   Alcohol more than 3/day or more than 7/wk No   Do you have a current opioid prescription? No   How severe/bad is pain from 1 to 10? 0/10 (No Pain)   Do you use any other substances recreationally? No     Social History     Tobacco Use    Smoking status: Never     Passive exposure: Past    Smokeless tobacco: Never   Vaping Use    Vaping status: Never Used   Substance Use Topics    Alcohol use: Yes     Alcohol/week: 2.0 standard drinks of alcohol     Comment: 1-2/week    Drug use: Not Currently           3/16/2025   AAA Screening   Family history of Abdominal  "Aortic Aneurysm (AAA)? No   Last PSA:   Prostate Specific Antigen Screen   Date Value Ref Range Status   02/03/2022 2.83 0.00 - 4.50 ug/L Final     PSA Tumor Marker   Date Value Ref Range Status   03/04/2024 2.72 0.00 - 4.50 ng/mL Final     ASCVD Risk   The ASCVD Risk score (Tamela SMALLS, et al., 2019) failed to calculate for the following reasons:    The systolic blood pressure is missing            Reviewed and updated as needed this visit by Provider                      Current providers sharing in care for this patient include:  Patient Care Team:  Sheldon Harvey MD as PCP - General (Internal Medicine)  Sheldon Harvey MD as Assigned PCP    The following health maintenance items are reviewed in Epic and correct as of today:  Health Maintenance   Topic Date Due    ANNUAL REVIEW OF HM ORDERS  Never done    HEPATITIS C SCREENING  Never done    BMP  03/04/2025    LIPID  03/04/2025    MEDICARE ANNUAL WELLNESS VISIT  03/04/2025    COVID-19 Vaccine (8 - 2024-25 season) 05/11/2025    FALL RISK ASSESSMENT  03/19/2026    DIABETES SCREENING  03/04/2027    ADVANCE CARE PLANNING  03/04/2029    COLORECTAL CANCER SCREENING  02/28/2030    DTAP/TDAP/TD IMMUNIZATION (3 - Td or Tdap) 07/13/2030    PHQ-2 (once per calendar year)  Completed    INFLUENZA VACCINE  Completed    Pneumococcal Vaccine: 50+ Years  Completed    ZOSTER IMMUNIZATION  Completed    RSV VACCINE  Completed    HPV IMMUNIZATION  Aged Out    MENINGITIS IMMUNIZATION  Aged Out            Objective    Exam  There were no vitals taken for this visit.   Estimated body mass index is 28.88 kg/m  as calculated from the following:    Height as of 3/4/24: 1.681 m (5' 6.2\").    Weight as of 3/4/24: 81.6 kg (180 lb).    Physical Exam          3/4/2024   Mini Cog   Clock Draw Score 2 Normal   3 Item Recall 3 objects recalled   Mini Cog Total Score 5              Signed Electronically by: Sheldon Harvey MD    "

## 2025-03-19 NOTE — PATIENT INSTRUCTIONS
Goal blood pressure less than 135/85 but not less than 110/60.    Avoid over exuberant exercise that causes pain or strain such as your shoulder tendinitis.  Increase stretching before playing pickle ball.    Repeat colonoscopy will be due February 2030.    Continue yearly PSA checks.    Yearly eye exams for glaucoma and retinal screening are recommended.  You may be due for your yearly eye exam this winter.    Follow-up in 1 year for annual wellness visit and blood pressure checkup appointment.

## 2025-03-20 LAB
CHOLEST SERPL-MCNC: 139 MG/DL
FASTING STATUS PATIENT QL REPORTED: YES
FERRITIN SERPL-MCNC: 13 NG/ML (ref 31–409)
HDLC SERPL-MCNC: 59 MG/DL
IRON BINDING CAPACITY (ROCHE): 404 UG/DL (ref 240–430)
IRON SATN MFR SERPL: 9 % (ref 15–46)
IRON SERPL-MCNC: 35 UG/DL (ref 61–157)
LDLC SERPL CALC-MCNC: 53 MG/DL
NONHDLC SERPL-MCNC: 80 MG/DL
PSA SERPL DL<=0.01 NG/ML-MCNC: 3.02 NG/ML (ref 0–4.5)
TRIGL SERPL-MCNC: 133 MG/DL
VIT B12 SERPL-MCNC: 659 PG/ML (ref 232–1245)

## 2025-05-10 ENCOUNTER — HEALTH MAINTENANCE LETTER (OUTPATIENT)
Age: 70
End: 2025-05-10